# Patient Record
Sex: MALE | Race: WHITE | Employment: PART TIME | URBAN - METROPOLITAN AREA
[De-identification: names, ages, dates, MRNs, and addresses within clinical notes are randomized per-mention and may not be internally consistent; named-entity substitution may affect disease eponyms.]

---

## 2019-07-22 ENCOUNTER — HOSPITAL ENCOUNTER (EMERGENCY)
Age: 24
Discharge: HOME | End: 2019-07-22
Payer: MEDICAID

## 2019-07-22 VITALS
BODY MASS INDEX: 23.61 KG/M2 | DIASTOLIC BLOOD PRESSURE: 67 MMHG | WEIGHT: 150.44 LBS | SYSTOLIC BLOOD PRESSURE: 119 MMHG | RESPIRATION RATE: 18 BRPM | HEART RATE: 55 BPM | HEIGHT: 67 IN | TEMPERATURE: 97.6 F | OXYGEN SATURATION: 97 %

## 2019-07-22 DIAGNOSIS — Z20.2 POSSIBLE EXPOSURE TO STD: Primary | ICD-10-CM

## 2019-07-22 LAB
BACTERIA: NEGATIVE /HPF
BILIRUBIN URINE: NEGATIVE MG/DL
BLOOD, URINE: ABNORMAL
CLARITY: CLEAR
COLOR: ABNORMAL
GLUCOSE, URINE: NEGATIVE MG/DL
KETONES, URINE: NEGATIVE MG/DL
LEUKOCYTE ESTERASE, URINE: NEGATIVE
NITRITE URINE, QUANTITATIVE: NEGATIVE
PH, URINE: 7 (ref 5–8)
PROTEIN UA: NEGATIVE MG/DL
RBC URINE: 2 /HPF (ref 0–3)
SPECIFIC GRAVITY UA: 1 (ref 1–1.03)
SQUAMOUS EPITHELIAL: <1 /HPF
TRICHOMONAS: ABNORMAL /HPF
UROBILINOGEN, URINE: NORMAL MG/DL (ref 0.2–1)
WBC UA: 1 /HPF (ref 0–2)

## 2019-07-22 PROCEDURE — 81001 URINALYSIS AUTO W/SCOPE: CPT

## 2019-07-22 PROCEDURE — 6370000000 HC RX 637 (ALT 250 FOR IP): Performed by: PHYSICIAN ASSISTANT

## 2019-07-22 PROCEDURE — 87491 CHLMYD TRACH DNA AMP PROBE: CPT

## 2019-07-22 PROCEDURE — 87591 N.GONORRHOEAE DNA AMP PROB: CPT

## 2019-07-22 PROCEDURE — 99283 EMERGENCY DEPT VISIT LOW MDM: CPT

## 2019-07-22 RX ORDER — AZITHROMYCIN 250 MG/1
2000 TABLET, FILM COATED ORAL ONCE
Status: COMPLETED | OUTPATIENT
Start: 2019-07-22 | End: 2019-07-22

## 2019-07-22 RX ADMIN — AZITHROMYCIN 2000 MG: 250 TABLET, FILM COATED ORAL at 17:28

## 2019-07-22 NOTE — ED NOTES
Discharge instructions given to pt, pt verbalized understanding. All questions answered. Follow-up instructions given.      Annamaria Cisneros RN  07/22/19 3210

## 2019-07-25 LAB
CHLAMYDIA TRACHOMATIS AMPLIFIED DET: NEGATIVE
CHLAMYDIA TRACHOMATIS AMPLIFIED DET: NORMAL
N GONORRHOEAE AMPLIFIED DET: NEGATIVE
N GONORRHOEAE AMPLIFIED DET: NORMAL

## 2019-08-30 ENCOUNTER — HOSPITAL ENCOUNTER (EMERGENCY)
Age: 24
Discharge: HOME | End: 2019-08-30
Payer: MEDICAID

## 2019-08-30 VITALS
WEIGHT: 150 LBS | RESPIRATION RATE: 17 BRPM | TEMPERATURE: 98 F | DIASTOLIC BLOOD PRESSURE: 72 MMHG | HEIGHT: 67 IN | HEART RATE: 62 BPM | BODY MASS INDEX: 23.54 KG/M2 | OXYGEN SATURATION: 98 % | SYSTOLIC BLOOD PRESSURE: 115 MMHG

## 2019-08-30 DIAGNOSIS — R68.89 OTHER GENERAL SYMPTOMS AND SIGNS: ICD-10-CM

## 2019-08-30 DIAGNOSIS — K08.89 PAIN, DENTAL: Primary | ICD-10-CM

## 2019-08-30 PROCEDURE — 99283 EMERGENCY DEPT VISIT LOW MDM: CPT

## 2019-08-30 PROCEDURE — 4500000027

## 2019-08-30 PROCEDURE — 2500000003 HC RX 250 WO HCPCS: Performed by: PHYSICIAN ASSISTANT

## 2019-08-30 RX ORDER — TRANEXAMIC ACID 100 MG/ML
1000 INJECTION, SOLUTION INTRAVENOUS ONCE
Status: COMPLETED | OUTPATIENT
Start: 2019-08-30 | End: 2019-08-30

## 2019-08-30 RX ORDER — BUPIVACAINE HYDROCHLORIDE AND EPINEPHRINE 5; 5 MG/ML; UG/ML
10 INJECTION, SOLUTION EPIDURAL; INTRACAUDAL; PERINEURAL ONCE
Status: COMPLETED | OUTPATIENT
Start: 2019-08-30 | End: 2019-08-30

## 2019-08-30 RX ADMIN — TRANEXAMIC ACID 1000 MG: 1 INJECTION, SOLUTION INTRAVENOUS at 18:53

## 2019-08-30 RX ADMIN — BUPIVACAINE HYDROCHLORIDE AND EPINEPHRINE 10 ML: 5; 5 INJECTION, SOLUTION EPIDURAL; INTRACAUDAL; PERINEURAL at 18:54

## 2019-08-30 ASSESSMENT — PAIN DESCRIPTION - PAIN TYPE: TYPE: ACUTE PAIN

## 2019-08-30 ASSESSMENT — PAIN SCALES - GENERAL
PAINLEVEL_OUTOF10: 8
PAINLEVEL_OUTOF10: 8

## 2019-08-30 ASSESSMENT — PAIN DESCRIPTION - LOCATION: LOCATION: TEETH

## 2019-09-08 ENCOUNTER — HOSPITAL ENCOUNTER (EMERGENCY)
Age: 24
Discharge: HOME | End: 2019-09-08
Attending: EMERGENCY MEDICINE
Payer: MEDICAID

## 2019-09-08 VITALS
WEIGHT: 145 LBS | BODY MASS INDEX: 22.76 KG/M2 | DIASTOLIC BLOOD PRESSURE: 73 MMHG | HEART RATE: 78 BPM | OXYGEN SATURATION: 98 % | HEIGHT: 67 IN | TEMPERATURE: 98.1 F | RESPIRATION RATE: 18 BRPM | SYSTOLIC BLOOD PRESSURE: 125 MMHG

## 2019-09-08 DIAGNOSIS — K02.9 DENTAL CARIES: ICD-10-CM

## 2019-09-08 DIAGNOSIS — K08.89 PAIN, DENTAL: Primary | ICD-10-CM

## 2019-09-08 PROCEDURE — 6370000000 HC RX 637 (ALT 250 FOR IP): Performed by: EMERGENCY MEDICINE

## 2019-09-08 PROCEDURE — 96372 THER/PROPH/DIAG INJ SC/IM: CPT

## 2019-09-08 PROCEDURE — 99282 EMERGENCY DEPT VISIT SF MDM: CPT

## 2019-09-08 PROCEDURE — 6360000002 HC RX W HCPCS: Performed by: EMERGENCY MEDICINE

## 2019-09-08 RX ORDER — CHLORHEXIDINE GLUCONATE 0.12 MG/ML
15 RINSE ORAL 2 TIMES DAILY
Qty: 420 ML | Refills: 0 | Status: SHIPPED | OUTPATIENT
Start: 2019-09-08 | End: 2019-09-22

## 2019-09-08 RX ORDER — CLINDAMYCIN HYDROCHLORIDE 300 MG/1
300 CAPSULE ORAL 4 TIMES DAILY
Qty: 40 CAPSULE | Refills: 0 | Status: SHIPPED | OUTPATIENT
Start: 2019-09-08 | End: 2019-09-18

## 2019-09-08 RX ORDER — ORPHENADRINE CITRATE 30 MG/ML
60 INJECTION INTRAMUSCULAR; INTRAVENOUS ONCE
Status: DISCONTINUED | OUTPATIENT
Start: 2019-09-08 | End: 2019-09-09 | Stop reason: HOSPADM

## 2019-09-08 RX ORDER — CLINDAMYCIN HYDROCHLORIDE 150 MG/1
300 CAPSULE ORAL ONCE
Status: COMPLETED | OUTPATIENT
Start: 2019-09-08 | End: 2019-09-08

## 2019-09-08 RX ORDER — KETOROLAC TROMETHAMINE 30 MG/ML
30 INJECTION, SOLUTION INTRAMUSCULAR; INTRAVENOUS ONCE
Status: COMPLETED | OUTPATIENT
Start: 2019-09-08 | End: 2019-09-08

## 2019-09-08 RX ORDER — CYCLOBENZAPRINE HCL 10 MG
10 TABLET ORAL 3 TIMES DAILY PRN
Qty: 15 TABLET | Refills: 0 | Status: SHIPPED | OUTPATIENT
Start: 2019-09-08 | End: 2019-09-18

## 2019-09-08 RX ORDER — ACETAMINOPHEN 500 MG
1000 TABLET ORAL 3 TIMES DAILY
Qty: 60 TABLET | Refills: 0 | Status: SHIPPED | OUTPATIENT
Start: 2019-09-08

## 2019-09-08 RX ORDER — ACETAMINOPHEN 500 MG
1000 TABLET ORAL ONCE
Status: COMPLETED | OUTPATIENT
Start: 2019-09-08 | End: 2019-09-08

## 2019-09-08 RX ADMIN — CLINDAMYCIN HYDROCHLORIDE 300 MG: 150 CAPSULE ORAL at 23:37

## 2019-09-08 RX ADMIN — KETOROLAC TROMETHAMINE 30 MG: 30 INJECTION, SOLUTION INTRAMUSCULAR at 23:37

## 2019-09-08 RX ADMIN — ACETAMINOPHEN 1000 MG: 500 TABLET ORAL at 23:37

## 2019-09-08 ASSESSMENT — PAIN DESCRIPTION - LOCATION: LOCATION: TEETH

## 2019-09-08 ASSESSMENT — PAIN DESCRIPTION - PAIN TYPE: TYPE: ACUTE PAIN

## 2019-09-08 ASSESSMENT — PAIN SCALES - GENERAL
PAINLEVEL_OUTOF10: 6

## 2019-09-08 ASSESSMENT — PAIN DESCRIPTION - ORIENTATION: ORIENTATION: LEFT;LOWER

## 2019-09-09 ASSESSMENT — ENCOUNTER SYMPTOMS
COUGH: 0
SHORTNESS OF BREATH: 0
ABDOMINAL PAIN: 0
SORE THROAT: 0

## 2019-09-10 NOTE — ED PROVIDER NOTES
Emergency Department Encounter    Patient: Morena Jett  MRN: [id number]  : 1995  Date of Evaluation: 2019  ED Provider:  Rosi Issa    Triage Chief Complaint:   Dental Pain    HPI:  Morena Jett is a 20 y.o. male that presents to the ED for left premolar dental pain. He reports the crown was fracture months ago and recently the tooth has been aching him over the past 2-3 days. He reports taking ibuprofen with minimal relief. He is a current smoker. He has seen dentistry in the past for the right upper molar requiring extractions. ROS:  Review of Systems   Constitutional: Positive for activity change. HENT: Positive for dental problem. Negative for drooling and sore throat. Respiratory: Negative for cough and shortness of breath. Cardiovascular: Negative for chest pain. Gastrointestinal: Negative for abdominal pain. Past Medical History:   Diagnosis Date    Cystic fibrosis ([facility] [address]     Migraine      Past Surgical History:   Procedure Laterality Date    TYMPANOSTOMY TUBE PLACEMENT       History reviewed. No pertinent family history.   Social History     Socioeconomic History    Marital status:      Spouse name: Not on file    Number of children: Not on file    Years of education: Not on file    Highest education level: Not on file   Occupational History    Not on file   Social Needs    Financial resource strain: Not on file    Food insecurity:     Worry: Not on file     Inability: Not on file    Transportation needs:     Medical: Not on file     Non-medical: Not on file   Tobacco Use    Smoking status: Current Some Day Smoker     Packs/day: 1.00     Types: Cigarettes    Smokeless tobacco: Never Used   Substance and Sexual Activity    Alcohol use: No    Drug use: Yes     Types: Marijuana    Sexual activity: Yes   Lifestyle    Physical activity:     Days per week: Not on file     Minutes per session: Not on file    Stress: Not on file   Relationships    oropharyngeal exudate. Poor dentition noted with 1st premolar with only root visualized. Tender to percussion. Uvula midline. He also has dissolvable sutures in the right upper premolars from previous extractions. Eyes: Right eye exhibits no discharge. Left eye exhibits no discharge. Patient is tracking me as I am walking around the room without noted EOM palsy   Neck: No tracheal deviation present. Cardiovascular: Intact distal pulses. Exam reveals no gallop and no friction rub. Pulmonary/Chest: No respiratory distress. He has no wheezes. He has no rales. Abdominal: Soft. There is no tenderness. There is no guarding. Musculoskeletal: He exhibits no tenderness or deformity. Neurological: He is alert. Skin: Skin is warm and dry. Psychiatric: Judgment normal.            I have reviewed and interpreted all of the currently available lab results from this visit (if applicable):  No results found for this visit on 09/08/19. Radiographs (if obtained):    [] Radiologist's Report Reviewed:  No orders to display       Chart review shows recent radiographs:  No results found. MDM:  No drooling or muffled voice. Uvula midline. Appears non-toxic. Poor dentition. Afebrile. Patient was informed on antibiotics and peridex with smoking cessation discussed. Discussed f/u with dentistry and gave him a list of dentist follow-up . Discussed oral hydration is key and discussed return precautions. He deferred nerve block at this time. Clinical Impression:  1. Pain, dental    2. Dental caries      Disposition referral (if applicable): Alonso Smith [phone] [facility]  [phone]    See this facility or any of the following listed dental facility tomorrow.     Disposition medications (if applicable):  Discharge Medication List as of 9/8/2019 11:30 PM      START taking these medications    Details   clindamycin (CLEOCIN) 300 MG capsule Take 1 capsule by

## 2020-04-12 ENCOUNTER — APPOINTMENT (OUTPATIENT)
Dept: GENERAL RADIOLOGY | Age: 25
End: 2020-04-12
Payer: MEDICAID

## 2020-04-12 ENCOUNTER — HOSPITAL ENCOUNTER (EMERGENCY)
Age: 25
Discharge: HOME | End: 2020-04-12
Attending: EMERGENCY MEDICINE
Payer: MEDICAID

## 2020-04-12 VITALS
HEIGHT: 65 IN | RESPIRATION RATE: 18 BRPM | OXYGEN SATURATION: 94 % | HEART RATE: 101 BPM | BODY MASS INDEX: 27.49 KG/M2 | DIASTOLIC BLOOD PRESSURE: 78 MMHG | TEMPERATURE: 98 F | WEIGHT: 165 LBS | SYSTOLIC BLOOD PRESSURE: 125 MMHG

## 2020-04-12 PROCEDURE — 71045 X-RAY EXAM CHEST 1 VIEW: CPT

## 2020-04-12 PROCEDURE — 99284 EMERGENCY DEPT VISIT MOD MDM: CPT

## 2020-04-12 RX ORDER — LEVOFLOXACIN 500 MG/1
500 TABLET, FILM COATED ORAL DAILY
Qty: 10 TABLET | Refills: 0 | Status: SHIPPED | OUTPATIENT
Start: 2020-04-12 | End: 2020-04-22

## 2020-04-12 RX ORDER — PREDNISONE 10 MG/1
TABLET ORAL
Qty: 42 TABLET | Refills: 0 | Status: SHIPPED | OUTPATIENT
Start: 2020-04-12 | End: 2020-04-24

## 2020-04-12 RX ORDER — GUAIFENESIN 600 MG/1
600 TABLET, EXTENDED RELEASE ORAL 2 TIMES DAILY
Qty: 20 TABLET | Refills: 0 | Status: SHIPPED | OUTPATIENT
Start: 2020-04-12 | End: 2020-04-22

## 2020-04-12 RX ORDER — ALBUTEROL SULFATE 90 UG/1
2 AEROSOL, METERED RESPIRATORY (INHALATION) EVERY 4 HOURS PRN
Qty: 1 INHALER | Refills: 0 | Status: SHIPPED | OUTPATIENT
Start: 2020-04-12 | End: 2021-07-08

## 2020-04-12 RX ORDER — CIPROFLOXACIN AND DEXAMETHASONE 3; 1 MG/ML; MG/ML
4 SUSPENSION/ DROPS AURICULAR (OTIC) 2 TIMES DAILY
Qty: 1 BOTTLE | Refills: 0 | Status: SHIPPED | OUTPATIENT
Start: 2020-04-12 | End: 2020-04-19

## 2020-04-12 ASSESSMENT — PAIN SCALES - GENERAL: PAINLEVEL_OUTOF10: 3

## 2020-04-13 ENCOUNTER — TELEPHONE (OUTPATIENT)
Dept: OTHER | Facility: CLINIC | Age: 25
End: 2020-04-13

## 2020-04-13 NOTE — ED PROVIDER NOTES
radial pulses 2+ bilaterally. Pulmonary/Chest: Effort normal. No respiratory distress. Speaks in full sentences. Wheezing noted in all lung fields with occasional rhonchi. No rales. Abdominal: Soft. Nontender to palpation. No distension. No guarding, rebound tenderness, or evidence of ascites. : No CVA tenderness. Musculoskeletal: Moves all extremities. No gross deformity. Neurological: Alert and oriented to person, place, and time. Normal muscle tone. Skin: Warm and dry. No rash. Psychiatric: Normal mood and affect. Behavior is normal.      Radiographs (if obtained):  [] The following radiograph was interpreted by myself in the absence of a radiologist:   [x] Radiologist's Report Reviewed:  XR CHEST PORTABLE   Final Result   Negative chest.                Labs  No results found for this visit on 04/12/20. MDM  Patient presents for cough, SOB. Has h/o CF, tobacco abuse, marijuana abuse. Exam reveals wheezing and rhonchi but no respiratory distress. CXR currently pending, patient refused COVID19 testing.     2200 EDT I have signed out [facility] Emergency Department care to Dr. Abdon Buchanan. We discussed the history, physical exam, completed/pending test results (if obtained) and current treatment plan. Please refer to his/her chart for the patients further details, remaining Emergency Department course, final disposition and diagnosis. I have independently evaluated this patient. Final Impression  1. Acute suppurative otitis media of both ears with spontaneous rupture of tympanic membranes, recurrence not specified    2. Tobacco abuse    3. Marijuana abuse        Blood pressure 125/78, pulse 101, temperature 98 °F (36.7 °C), temperature source Oral, resp. rate 18, height 5' 5\" (1.651 m), weight 165 lb (74.8 kg), SpO2 94 %. This chart was generated using the [employer] dictation system.  I created this record but it may contain dictation errors given the limitations of

## 2020-04-13 NOTE — TELEPHONE ENCOUNTER
Patient contacted regarding COVID-19 risk and screening.  Unable to reach patient, left VM and requested return call

## 2020-04-13 NOTE — ED NOTES
This RN attempted to swab pt and refused. Stated he has not left his house in over 1 month and has only been around his wife and she has not left the house. Pt states he has CF and that's why he is coughing worse and just needs steroids and breathing treatment.       Alex Hernandez, RN  04/12/20 5620

## 2021-03-04 ENCOUNTER — HOSPITAL ENCOUNTER (EMERGENCY)
Age: 26
Discharge: LEFT BEFORE DISCHARGE | End: 2021-03-04
Payer: MEDICAID

## 2021-03-04 VITALS
DIASTOLIC BLOOD PRESSURE: 82 MMHG | SYSTOLIC BLOOD PRESSURE: 120 MMHG | HEART RATE: 82 BPM | TEMPERATURE: 98 F | RESPIRATION RATE: 20 BRPM | OXYGEN SATURATION: 92 %

## 2021-03-04 RX ORDER — MORPHINE SULFATE 4 MG/ML
4 INJECTION, SOLUTION INTRAMUSCULAR; INTRAVENOUS ONCE
Status: DISCONTINUED | OUTPATIENT
Start: 2021-03-04 | End: 2021-03-04 | Stop reason: HOSPADM

## 2021-03-04 RX ORDER — ONDANSETRON 2 MG/ML
4 INJECTION INTRAMUSCULAR; INTRAVENOUS ONCE
Status: DISCONTINUED | OUTPATIENT
Start: 2021-03-04 | End: 2021-03-04 | Stop reason: HOSPADM

## 2021-03-04 ASSESSMENT — PAIN DESCRIPTION - LOCATION: LOCATION: GROIN

## 2021-03-04 NOTE — Clinical Note
Patient left without being seen after triage. Patient became loud and verbally abusive to staff, security escorted patient out of ED he was aggressively trying to induce an altercation with security. [employer] called.

## 2021-03-04 NOTE — ED PROVIDER NOTES
Tele-Triage Note    I evaluated this patient via a TeleHealth platform as a physician-in-triage. I performed a medical screening evaluation on the patient remotely via the [employer]. I performed a medical screening examination and evaluated this patient briefly with the purpose of initiating their ED workup in an expeditious manner. Please see notes from other ED providers regarding comprehensive evaluation including full history, physical exam, interpretation of results, and medical decision making/disposition. In brief, Morena Pike is a 30 y.o. male who presents to the ED complaining of with left lower abdominal and left testicular pain with onset about 5 days ago which had been intermittent but became more constant this afternoon evening. Is severe intensity. Worsened with movement and palpation. Denies any swelling of the scrotum or testicle however. Denies any urinary symptoms or penile discharge. Denies any other associated symptoms are complaints. States he did pass a kidney stone about a week ago. Focused physical examination notable for moderate acute distress and appears very uncomfortable, otherwise well-appearing, well-nourished, normal speech and mentation without obvious facial droop, no obvious rash. No obvious cranial nerve deficits on my brief remote exam using telehealth technology. Vitals reviewed per nursing documentation. Triage orders placed, including CBC, BMP, urinalysis, GC and Chlamydia tests, CT of the abdomen pelvis and ultrasound of the testicles and scrotum. I did not personally review any of the results of these tests, which will be reviewed and interpreted later by ED providers at the time of the patient's more comprehensive evaluation.       Electronically signed by: Marga Sanders M.D., 3/4/2021 6:43 PM        Abril Dumont MD  03/04/21 0356

## 2021-03-05 ENCOUNTER — APPOINTMENT (OUTPATIENT)
Dept: ULTRASOUND IMAGING | Age: 26
End: 2021-03-05
Payer: MEDICAID

## 2021-03-05 ENCOUNTER — HOSPITAL ENCOUNTER (EMERGENCY)
Age: 26
Discharge: HOME | End: 2021-03-05
Attending: EMERGENCY MEDICINE
Payer: MEDICAID

## 2021-03-05 VITALS
RESPIRATION RATE: 16 BRPM | DIASTOLIC BLOOD PRESSURE: 74 MMHG | TEMPERATURE: 98.8 F | BODY MASS INDEX: 26.52 KG/M2 | HEART RATE: 63 BPM | HEIGHT: 66 IN | WEIGHT: 165 LBS | OXYGEN SATURATION: 96 % | SYSTOLIC BLOOD PRESSURE: 136 MMHG

## 2021-03-05 DIAGNOSIS — I86.1 VARICOCELE: Primary | ICD-10-CM

## 2021-03-05 LAB
BACTERIA: ABNORMAL /HPF
BILIRUBIN URINE: NEGATIVE MG/DL
BLOOD, URINE: ABNORMAL
CALCIUM OXALATE CRYSTALS: ABNORMAL /HPF
CLARITY: ABNORMAL
COLOR: ABNORMAL
GLUCOSE, URINE: NEGATIVE MG/DL
KETONES, URINE: ABNORMAL MG/DL
LEUKOCYTE ESTERASE, URINE: ABNORMAL
MUCUS: ABNORMAL HPF
NITRITE URINE, QUANTITATIVE: NEGATIVE
PH, URINE: 5 (ref 5–8)
PROTEIN UA: 100 MG/DL
RBC URINE: 1417 /HPF (ref 0–3)
SPECIFIC GRAVITY UA: 1.04 (ref 1–1.03)
TRICHOMONAS: ABNORMAL /HPF
UROBILINOGEN, URINE: NEGATIVE MG/DL (ref 0.2–1)
WBC UA: 16 /HPF (ref 0–2)

## 2021-03-05 PROCEDURE — 93975 VASCULAR STUDY: CPT

## 2021-03-05 PROCEDURE — 87491 CHLMYD TRACH DNA AMP PROBE: CPT

## 2021-03-05 PROCEDURE — 99284 EMERGENCY DEPT VISIT MOD MDM: CPT

## 2021-03-05 PROCEDURE — 87591 N.GONORRHOEAE DNA AMP PROB: CPT

## 2021-03-05 PROCEDURE — 6370000000 HC RX 637 (ALT 250 FOR IP): Performed by: EMERGENCY MEDICINE

## 2021-03-05 PROCEDURE — 81001 URINALYSIS AUTO W/SCOPE: CPT

## 2021-03-05 PROCEDURE — 76870 US EXAM SCROTUM: CPT

## 2021-03-05 RX ORDER — HYDROCODONE BITARTRATE AND ACETAMINOPHEN 5; 325 MG/1; MG/1
1 TABLET ORAL ONCE
Status: COMPLETED | OUTPATIENT
Start: 2021-03-05 | End: 2021-03-05

## 2021-03-05 RX ADMIN — HYDROCODONE BITARTRATE AND ACETAMINOPHEN 1 TABLET: 5; 325 TABLET ORAL at 04:59

## 2021-03-05 ASSESSMENT — PAIN DESCRIPTION - PAIN TYPE: TYPE: ACUTE PAIN

## 2021-03-05 ASSESSMENT — PAIN DESCRIPTION - ORIENTATION: ORIENTATION: LEFT

## 2021-03-05 NOTE — ED PROVIDER NOTES
Tenderness to palpation over left epididymis. No scrotal swelling or erythema. Normal testicular lie. EXTREMITIES: No acute deformities. SKIN: Warm and dry. NEUROLOGICAL: No gross facial drooping. Moves all 4 extremities spontaneously. PSYCHIATRIC: Normal mood. I have reviewed and interpreted all of the currently available lab results from this visit (if applicable):  No results found for this visit on 03/05/21. Radiographs (if obtained):  [] The following radiograph was interpreted by myself in the absence of a radiologist:  [] Radiologist's Report Reviewed:    EKG (if obtained): (All EKG's are interpreted by myself in the absence of a cardiologist)    MDM:  Plan of care is discussed thoroughly with the patient and family if present. If performed, all imaging and lab work also discussed with patient. All relevant prior results and chart reviewed if available. Patient presents as above. Vital signs are normal.  Presents with isolated left testicular pain and tenderness over the epididymis. No obvious hernia. Patient has benign abdominal exam.  Suspect primary testicular etiology at this time. Ultrasound ordered for further evaluation. Patient was given Norco for pain. Urinalysis ordered. Patient doing well on reevaluation. Ultrasound shows no evidence of torsion. Varicocele is noted which is likely given the patient some discomfort. He is given urology follow-up, discharged in good condition. Clinical Impression:  1.  Varicocele      (Please note that portions of this note may have been completed with a voice recognition program. Efforts were made to edit the dictations but occasionally words are mis-transcribed.)    MD Nitza Juarez MD  03/05/21 2459

## 2021-03-06 LAB
CHLAMYDIA TRACHOMATIS AMPLIFIED DET: NEGATIVE
N GONORRHOEAE AMPLIFIED DET: NEGATIVE

## 2021-06-01 ENCOUNTER — HOSPITAL ENCOUNTER (EMERGENCY)
Age: 26
Discharge: LEFT BEFORE DISCHARGE | End: 2021-06-02
Payer: MEDICAID

## 2021-06-01 PROCEDURE — 99283 EMERGENCY DEPT VISIT LOW MDM: CPT

## 2021-06-01 ASSESSMENT — PAIN SCALES - GENERAL: PAINLEVEL_OUTOF10: 8

## 2021-06-02 ENCOUNTER — APPOINTMENT (OUTPATIENT)
Dept: ULTRASOUND IMAGING | Age: 26
End: 2021-06-02
Payer: MEDICAID

## 2021-06-02 VITALS — HEART RATE: 72 BPM | RESPIRATION RATE: 19 BRPM | TEMPERATURE: 98 F | OXYGEN SATURATION: 97 %

## 2021-06-02 LAB
BACTERIA: ABNORMAL /HPF
BILIRUBIN URINE: NEGATIVE MG/DL
BLOOD, URINE: ABNORMAL
CLARITY: ABNORMAL
COLOR: YELLOW
GLUCOSE, URINE: NEGATIVE MG/DL
KETONES, URINE: ABNORMAL MG/DL
LEUKOCYTE ESTERASE, URINE: ABNORMAL
MUCUS: ABNORMAL HPF
NITRITE URINE, QUANTITATIVE: NEGATIVE
PH, URINE: 5 (ref 5–8)
PROTEIN UA: 100 MG/DL
RBC URINE: 568 /HPF (ref 0–3)
SPECIFIC GRAVITY UA: 1.03 (ref 1–1.03)
SQUAMOUS EPITHELIAL: 1 /HPF
TRICHOMONAS: ABNORMAL /HPF
URIC ACID CRYSTALS: ABNORMAL /HPF
UROBILINOGEN, URINE: 2 MG/DL (ref 0.2–1)
WBC UA: 24 /HPF (ref 0–2)

## 2021-06-02 PROCEDURE — 87086 URINE CULTURE/COLONY COUNT: CPT

## 2021-06-02 PROCEDURE — 76870 US EXAM SCROTUM: CPT

## 2021-06-02 PROCEDURE — 93975 VASCULAR STUDY: CPT

## 2021-06-02 PROCEDURE — 81001 URINALYSIS AUTO W/SCOPE: CPT

## 2021-06-02 RX ORDER — ACETAMINOPHEN 325 MG/1
650 TABLET ORAL ONCE
Status: DISCONTINUED | OUTPATIENT
Start: 2021-06-02 | End: 2021-06-02 | Stop reason: HOSPADM

## 2021-06-02 NOTE — ED PROVIDER NOTES
Tele-Triage Note    I evaluated this patient via a TeleHealth platform as a Physician in triage. I performed a medical screening evaluation on the patient remotely via the [employer]. I performed a medical screening examination and evaluated this patient briefly with the purpose of initiating their ED workup in an expeditious manner. Please see notes from other ED providers regarding comprehensive evaluation including full history, physical exam, interpretation of results, and medical decision making/disposition. In brief, Tiago Hernandez is a 30 y.o. male who presents to the ED complaining of right testicular pain. Patient reports pain for the past 2 months but reports worsened today. He denies any trauma. He does report difficulty peeing but reports that he is able to urinate however it is usually smaller amounts than he would expect. Focused physical examination notable for no acute distress, well-appearing, well-nourished, normal speech and mentation without obvious facial droop, no obvious rash. No obvious cranial nerve deficits on my brief remote exam using telehealth technology. Vitals reviewed per nursing documentation. Triage orders placed, including testicular ultrasound, urinalysis, GC and chlamydia, urine treat, p.o. Tylenol. I did not personally review any of the results of these tests, which will be reviewed and interpreted later by ED providers at the time of the patient's more comprehensive evaluation.        Albania Graham MD  06/02/21 0688

## 2021-06-02 NOTE — ED NOTES
Patient called to room unable to be located. Restroom, lobby and parking lot checked.       Galilea Oneal RN  06/02/21 9477

## 2021-06-03 LAB
CULTURE: NORMAL
Lab: NORMAL
SPECIMEN: NORMAL

## 2021-07-08 ENCOUNTER — APPOINTMENT (OUTPATIENT)
Dept: CT IMAGING | Age: 26
End: 2021-07-08
Payer: MEDICAID

## 2021-07-08 ENCOUNTER — HOSPITAL ENCOUNTER (EMERGENCY)
Age: 26
Discharge: HOME | End: 2021-07-08
Attending: EMERGENCY MEDICINE
Payer: MEDICAID

## 2021-07-08 ENCOUNTER — APPOINTMENT (OUTPATIENT)
Dept: GENERAL RADIOLOGY | Age: 26
End: 2021-07-08
Payer: MEDICAID

## 2021-07-08 VITALS
HEIGHT: 67 IN | OXYGEN SATURATION: 95 % | TEMPERATURE: 98.1 F | RESPIRATION RATE: 16 BRPM | DIASTOLIC BLOOD PRESSURE: 74 MMHG | BODY MASS INDEX: 25.9 KG/M2 | SYSTOLIC BLOOD PRESSURE: 141 MMHG | HEART RATE: 80 BPM | WEIGHT: 165 LBS

## 2021-07-08 DIAGNOSIS — F14.10 COCAINE ABUSE (HCC): ICD-10-CM

## 2021-07-08 DIAGNOSIS — N17.9 ACUTE RENAL FAILURE, UNSPECIFIED ACUTE RENAL FAILURE TYPE (HCC): Primary | ICD-10-CM

## 2021-07-08 DIAGNOSIS — R11.2 NAUSEA AND VOMITING, INTRACTABILITY OF VOMITING NOT SPECIFIED, UNSPECIFIED VOMITING TYPE: ICD-10-CM

## 2021-07-08 LAB
ALBUMIN SERPL-MCNC: 5.3 GM/DL (ref 3.4–5)
ALP BLD-CCNC: 135 IU/L (ref 40–128)
ALT SERPL-CCNC: 26 U/L (ref 10–40)
AMPHETAMINES: NEGATIVE
ANION GAP SERPL CALCULATED.3IONS-SCNC: 27 MMOL/L (ref 4–16)
AST SERPL-CCNC: 19 IU/L (ref 15–37)
BACTERIA: NEGATIVE /HPF
BARBITURATE SCREEN URINE: NEGATIVE
BASOPHILS ABSOLUTE: 0.1 K/CU MM
BASOPHILS RELATIVE PERCENT: 0.3 % (ref 0–1)
BENZODIAZEPINE SCREEN, URINE: NEGATIVE
BILIRUB SERPL-MCNC: 0.9 MG/DL (ref 0–1)
BILIRUBIN URINE: NEGATIVE MG/DL
BLOOD, URINE: NEGATIVE
BUN BLDV-MCNC: 44 MG/DL (ref 6–23)
CALCIUM SERPL-MCNC: 10.9 MG/DL (ref 8.3–10.6)
CANNABINOID SCREEN URINE: ABNORMAL
CHLORIDE BLD-SCNC: 81 MMOL/L (ref 99–110)
CHLORIDE URINE RANDOM: 10 MMOL/L (ref 43–210)
CLARITY: CLEAR
CO2: 21 MMOL/L (ref 21–32)
COCAINE METABOLITE: ABNORMAL
COLOR: YELLOW
CREAT SERPL-MCNC: 2.8 MG/DL (ref 0.9–1.3)
CREATININE URINE: 270.2 MG/DL (ref 39–259)
DIFFERENTIAL TYPE: ABNORMAL
EKG ATRIAL RATE: 94 BPM
EKG DIAGNOSIS: NORMAL
EKG P AXIS: 66 DEGREES
EKG P-R INTERVAL: 140 MS
EKG Q-T INTERVAL: 364 MS
EKG QRS DURATION: 98 MS
EKG QTC CALCULATION (BAZETT): 455 MS
EKG R AXIS: 231 DEGREES
EKG T AXIS: 37 DEGREES
EKG VENTRICULAR RATE: 94 BPM
EOSINOPHILS ABSOLUTE: 0 K/CU MM
EOSINOPHILS RELATIVE PERCENT: 0 % (ref 0–3)
ESTIMATED AVERAGE GLUCOSE: 146 MG/DL
GFR AFRICAN AMERICAN: 33 ML/MIN/1.73M2
GFR NON-AFRICAN AMERICAN: 27 ML/MIN/1.73M2
GLUCOSE BLD-MCNC: 260 MG/DL (ref 70–99)
GLUCOSE, URINE: NEGATIVE MG/DL
HBA1C MFR BLD: 6.7 % (ref 4.2–6.3)
HCT VFR BLD CALC: 54 % (ref 42–52)
HEMOGLOBIN: 19.9 GM/DL (ref 13.5–18)
HYALINE CASTS: >20 /LPF
IMMATURE NEUTROPHIL %: 0.6 % (ref 0–0.43)
KETONES, URINE: NEGATIVE MG/DL
LEUKOCYTE ESTERASE, URINE: NEGATIVE
LIPASE: 138 IU/L (ref 13–60)
LYMPHOCYTES ABSOLUTE: 1.7 K/CU MM
LYMPHOCYTES RELATIVE PERCENT: 7.1 % (ref 24–44)
MAGNESIUM: 2.9 MG/DL (ref 1.8–2.4)
MCH RBC QN AUTO: 30.3 PG (ref 27–31)
MCHC RBC AUTO-ENTMCNC: 36.9 % (ref 32–36)
MCV RBC AUTO: 82.3 FL (ref 78–100)
MONOCYTES ABSOLUTE: 1.1 K/CU MM
MONOCYTES RELATIVE PERCENT: 4.6 % (ref 0–4)
MUCUS: ABNORMAL HPF
NITRITE URINE, QUANTITATIVE: NEGATIVE
NUCLEATED RBC %: 0 %
OPIATES, URINE: NEGATIVE
OXYCODONE: NEGATIVE
PDW BLD-RTO: 12 % (ref 11.7–14.9)
PH, URINE: 5 (ref 5–8)
PHENCYCLIDINE, URINE: NEGATIVE
PLATELET # BLD: 337 K/CU MM (ref 140–440)
PMV BLD AUTO: 9.9 FL (ref 7.5–11.1)
POTASSIUM SERPL-SCNC: 3.5 MMOL/L (ref 3.5–5.1)
POTASSIUM, UR: 66.8 MMOL/L (ref 22–119)
PRO-BNP: 66.68 PG/ML
PROT/CREAT RATIO, UR: 0.1
PROTEIN UA: NEGATIVE MG/DL
RBC # BLD: 6.56 M/CU MM (ref 4.6–6.2)
RBC URINE: 1 /HPF (ref 0–3)
SARS-COV-2, NAAT: NOT DETECTED
SEGMENTED NEUTROPHILS ABSOLUTE COUNT: 20.6 K/CU MM
SEGMENTED NEUTROPHILS RELATIVE PERCENT: 87.4 % (ref 36–66)
SODIUM BLD-SCNC: 129 MMOL/L (ref 135–145)
SODIUM URINE: 12 MMOL/L (ref 35–167)
SOURCE: NORMAL
SPECIFIC GRAVITY UA: 1.02 (ref 1–1.03)
TOTAL CK: 345 IU/L (ref 38–174)
TOTAL IMMATURE NEUTOROPHIL: 0.15 K/CU MM
TOTAL NUCLEATED RBC: 0 K/CU MM
TOTAL PROTEIN: 10.1 GM/DL (ref 6.4–8.2)
TRICHOMONAS: ABNORMAL /HPF
TROPONIN T: <0.01 NG/ML
URINE TOTAL PROTEIN: 22.3 MG/DL
UROBILINOGEN, URINE: NEGATIVE MG/DL (ref 0.2–1)
WBC # BLD: 23.6 K/CU MM (ref 4–10.5)
WBC UA: 1 /HPF (ref 0–2)

## 2021-07-08 PROCEDURE — 82436 ASSAY OF URINE CHLORIDE: CPT

## 2021-07-08 PROCEDURE — 82570 ASSAY OF URINE CREATININE: CPT

## 2021-07-08 PROCEDURE — 96376 TX/PRO/DX INJ SAME DRUG ADON: CPT

## 2021-07-08 PROCEDURE — 81001 URINALYSIS AUTO W/SCOPE: CPT

## 2021-07-08 PROCEDURE — 71045 X-RAY EXAM CHEST 1 VIEW: CPT

## 2021-07-08 PROCEDURE — 80048 BASIC METABOLIC PNL TOTAL CA: CPT

## 2021-07-08 PROCEDURE — 2580000003 HC RX 258: Performed by: NURSE PRACTITIONER

## 2021-07-08 PROCEDURE — 84702 CHORIONIC GONADOTROPIN TEST: CPT

## 2021-07-08 PROCEDURE — 83690 ASSAY OF LIPASE: CPT

## 2021-07-08 PROCEDURE — 96375 TX/PRO/DX INJ NEW DRUG ADDON: CPT

## 2021-07-08 PROCEDURE — 83880 ASSAY OF NATRIURETIC PEPTIDE: CPT

## 2021-07-08 PROCEDURE — 2580000003 HC RX 258: Performed by: PHYSICIAN ASSISTANT

## 2021-07-08 PROCEDURE — 83036 HEMOGLOBIN GLYCOSYLATED A1C: CPT

## 2021-07-08 PROCEDURE — 74176 CT ABD & PELVIS W/O CONTRAST: CPT

## 2021-07-08 PROCEDURE — 85025 COMPLETE CBC W/AUTO DIFF WBC: CPT

## 2021-07-08 PROCEDURE — 1200000000 HC SEMI PRIVATE

## 2021-07-08 PROCEDURE — 6370000000 HC RX 637 (ALT 250 FOR IP): Performed by: EMERGENCY MEDICINE

## 2021-07-08 PROCEDURE — 96374 THER/PROPH/DIAG INJ IV PUSH: CPT

## 2021-07-08 PROCEDURE — 84156 ASSAY OF PROTEIN URINE: CPT

## 2021-07-08 PROCEDURE — 96361 HYDRATE IV INFUSION ADD-ON: CPT

## 2021-07-08 PROCEDURE — 93010 ELECTROCARDIOGRAM REPORT: CPT | Performed by: INTERNAL MEDICINE

## 2021-07-08 PROCEDURE — 84484 ASSAY OF TROPONIN QUANT: CPT

## 2021-07-08 PROCEDURE — C9113 INJ PANTOPRAZOLE SODIUM, VIA: HCPCS | Performed by: PHYSICIAN ASSISTANT

## 2021-07-08 PROCEDURE — 83935 ASSAY OF URINE OSMOLALITY: CPT

## 2021-07-08 PROCEDURE — 83735 ASSAY OF MAGNESIUM: CPT

## 2021-07-08 PROCEDURE — 84300 ASSAY OF URINE SODIUM: CPT

## 2021-07-08 PROCEDURE — 80307 DRUG TEST PRSMV CHEM ANLYZR: CPT

## 2021-07-08 PROCEDURE — 6360000002 HC RX W HCPCS: Performed by: PHYSICIAN ASSISTANT

## 2021-07-08 PROCEDURE — 2580000003 HC RX 258: Performed by: EMERGENCY MEDICINE

## 2021-07-08 PROCEDURE — 93005 ELECTROCARDIOGRAM TRACING: CPT | Performed by: EMERGENCY MEDICINE

## 2021-07-08 PROCEDURE — 96365 THER/PROPH/DIAG IV INF INIT: CPT

## 2021-07-08 PROCEDURE — 80053 COMPREHEN METABOLIC PANEL: CPT

## 2021-07-08 PROCEDURE — 6360000002 HC RX W HCPCS: Performed by: NURSE PRACTITIONER

## 2021-07-08 PROCEDURE — 84133 ASSAY OF URINE POTASSIUM: CPT

## 2021-07-08 PROCEDURE — 82550 ASSAY OF CK (CPK): CPT

## 2021-07-08 PROCEDURE — 71250 CT THORAX DX C-: CPT

## 2021-07-08 PROCEDURE — 99285 EMERGENCY DEPT VISIT HI MDM: CPT

## 2021-07-08 PROCEDURE — 87635 SARS-COV-2 COVID-19 AMP PRB: CPT

## 2021-07-08 PROCEDURE — 6360000002 HC RX W HCPCS: Performed by: EMERGENCY MEDICINE

## 2021-07-08 PROCEDURE — 87040 BLOOD CULTURE FOR BACTERIA: CPT

## 2021-07-08 RX ORDER — PANTOPRAZOLE SODIUM 40 MG/10ML
40 INJECTION, POWDER, LYOPHILIZED, FOR SOLUTION INTRAVENOUS DAILY
Status: DISCONTINUED | OUTPATIENT
Start: 2021-07-09 | End: 2021-07-08 | Stop reason: HOSPADM

## 2021-07-08 RX ORDER — ONDANSETRON 2 MG/ML
4 INJECTION INTRAMUSCULAR; INTRAVENOUS EVERY 6 HOURS PRN
Status: DISCONTINUED | OUTPATIENT
Start: 2021-07-08 | End: 2021-07-08 | Stop reason: HOSPADM

## 2021-07-08 RX ORDER — ACETAMINOPHEN 650 MG/1
650 SUPPOSITORY RECTAL EVERY 6 HOURS PRN
Status: DISCONTINUED | OUTPATIENT
Start: 2021-07-08 | End: 2021-07-08 | Stop reason: HOSPADM

## 2021-07-08 RX ORDER — 0.9 % SODIUM CHLORIDE 0.9 %
1000 INTRAVENOUS SOLUTION INTRAVENOUS ONCE
Status: COMPLETED | OUTPATIENT
Start: 2021-07-08 | End: 2021-07-08

## 2021-07-08 RX ORDER — SODIUM CHLORIDE 9 MG/ML
INJECTION, SOLUTION INTRAVENOUS CONTINUOUS
Status: DISCONTINUED | OUTPATIENT
Start: 2021-07-08 | End: 2021-07-08 | Stop reason: HOSPADM

## 2021-07-08 RX ORDER — ONDANSETRON 4 MG/1
4 TABLET, ORALLY DISINTEGRATING ORAL EVERY 8 HOURS PRN
Status: DISCONTINUED | OUTPATIENT
Start: 2021-07-08 | End: 2021-07-08 | Stop reason: HOSPADM

## 2021-07-08 RX ORDER — LORAZEPAM 2 MG/ML
1 INJECTION INTRAMUSCULAR EVERY 6 HOURS PRN
Status: DISCONTINUED | OUTPATIENT
Start: 2021-07-08 | End: 2021-07-08 | Stop reason: HOSPADM

## 2021-07-08 RX ORDER — SODIUM CHLORIDE 0.9 % (FLUSH) 0.9 %
5-40 SYRINGE (ML) INJECTION EVERY 12 HOURS SCHEDULED
Status: DISCONTINUED | OUTPATIENT
Start: 2021-07-08 | End: 2021-07-08 | Stop reason: HOSPADM

## 2021-07-08 RX ORDER — ONDANSETRON 2 MG/ML
4 INJECTION INTRAMUSCULAR; INTRAVENOUS EVERY 30 MIN PRN
Status: DISCONTINUED | OUTPATIENT
Start: 2021-07-08 | End: 2021-07-08 | Stop reason: HOSPADM

## 2021-07-08 RX ORDER — IPRATROPIUM BROMIDE AND ALBUTEROL SULFATE 2.5; .5 MG/3ML; MG/3ML
1 SOLUTION RESPIRATORY (INHALATION)
Status: DISCONTINUED | OUTPATIENT
Start: 2021-07-08 | End: 2021-07-08 | Stop reason: HOSPADM

## 2021-07-08 RX ORDER — SODIUM CHLORIDE 0.9 % (FLUSH) 0.9 %
5-40 SYRINGE (ML) INJECTION PRN
Status: DISCONTINUED | OUTPATIENT
Start: 2021-07-08 | End: 2021-07-08 | Stop reason: HOSPADM

## 2021-07-08 RX ORDER — NICOTINE 21 MG/24HR
1 PATCH, TRANSDERMAL 24 HOURS TRANSDERMAL DAILY
Status: DISCONTINUED | OUTPATIENT
Start: 2021-07-08 | End: 2021-07-08 | Stop reason: HOSPADM

## 2021-07-08 RX ORDER — MAGNESIUM HYDROXIDE/ALUMINUM HYDROXICE/SIMETHICONE 120; 1200; 1200 MG/30ML; MG/30ML; MG/30ML
30 SUSPENSION ORAL ONCE
Status: COMPLETED | OUTPATIENT
Start: 2021-07-08 | End: 2021-07-08

## 2021-07-08 RX ORDER — ACETAMINOPHEN 325 MG/1
650 TABLET ORAL EVERY 6 HOURS PRN
Status: DISCONTINUED | OUTPATIENT
Start: 2021-07-08 | End: 2021-07-08 | Stop reason: HOSPADM

## 2021-07-08 RX ORDER — SODIUM CHLORIDE 9 MG/ML
25 INJECTION, SOLUTION INTRAVENOUS PRN
Status: DISCONTINUED | OUTPATIENT
Start: 2021-07-08 | End: 2021-07-08 | Stop reason: HOSPADM

## 2021-07-08 RX ORDER — PANTOPRAZOLE SODIUM 40 MG/10ML
40 INJECTION, POWDER, LYOPHILIZED, FOR SOLUTION INTRAVENOUS ONCE
Status: COMPLETED | OUTPATIENT
Start: 2021-07-08 | End: 2021-07-08

## 2021-07-08 RX ADMIN — SODIUM CHLORIDE 1000 ML: 9 INJECTION, SOLUTION INTRAVENOUS at 07:04

## 2021-07-08 RX ADMIN — SODIUM CHLORIDE 1000 ML: 9 INJECTION, SOLUTION INTRAVENOUS at 06:16

## 2021-07-08 RX ADMIN — PANTOPRAZOLE SODIUM 40 MG: 40 INJECTION, POWDER, FOR SOLUTION INTRAVENOUS at 06:17

## 2021-07-08 RX ADMIN — MEROPENEM 1000 MG: 1 INJECTION, POWDER, FOR SOLUTION INTRAVENOUS at 09:11

## 2021-07-08 RX ADMIN — SODIUM CHLORIDE: 9 INJECTION, SOLUTION INTRAVENOUS at 14:37

## 2021-07-08 RX ADMIN — ONDANSETRON 4 MG: 2 INJECTION INTRAMUSCULAR; INTRAVENOUS at 14:34

## 2021-07-08 RX ADMIN — ALUMINUM HYDROXIDE, MAGNESIUM HYDROXIDE, AND SIMETHICONE 30 ML: 200; 200; 20 SUSPENSION ORAL at 06:17

## 2021-07-08 RX ADMIN — ONDANSETRON 4 MG: 2 INJECTION INTRAMUSCULAR; INTRAVENOUS at 06:17

## 2021-07-08 ASSESSMENT — PAIN DESCRIPTION - LOCATION: LOCATION: CHEST;THROAT

## 2021-07-08 ASSESSMENT — PAIN SCALES - GENERAL: PAINLEVEL_OUTOF10: 8

## 2021-07-08 ASSESSMENT — PAIN DESCRIPTION - PAIN TYPE: TYPE: ACUTE PAIN

## 2021-07-08 NOTE — ED NOTES
They were taking him up to his room.  He was told he could take a smoke break before he could go up stairs so he wanted his cig first before blood     Josue Barrios  07/08/21 1600

## 2021-07-08 NOTE — PROGRESS NOTES
PHARMACY THERAPEUTIC INTERCHANGE    Estimated Creatinine Clearance: 37 mL/min (A) (based on SCr of 2.8 mg/dL (H)).   Recent Labs     07/08/21  0601   BUN 44*   CREATININE 2.8*     Indication: Appendicitis   Ordered Drug: Ertapenem 1g x 1 (Non-Formulary Carbapenem Abx)   Interchange Drug: Meropenem 1g x 1 (Formulary Carbapenem Abx)    Hx of PCN allergy (anaphylaxis)     Jeffry Ervin [facility]   7/8/2021  8:53 AM

## 2021-07-08 NOTE — Clinical Note
Patient Class: Inpatient [101]   REQUIRED: Diagnosis: WILL (acute kidney injury) ([facility] [address] [[id number]]   Estimated Length of Stay: Estimated stay of more than 2 midnights   Telemetry/Cardiac Monitoring Required?: Yes

## 2021-07-08 NOTE — H&P
asleep. Currently denies abdominal pain. Denies drug use although told ED provider recent cocaine use. Has a significant medical history for cystic fibrosis but states lost to follow-up due to insurance issues. Denies any shortness of breath and shakes his head no when asked if there is any change in his baseline or worsening productive cough. Ten point ROS: reviewed negative, unless as noted in above HPI. Objective:   No intake or output data in the 24 hours ending 07/08/21 0731     Vitals:   Vitals:    07/08/21 0539 07/08/21 0700   BP: 129/87 109/73   Pulse: 97 79   Resp: 22 17   Temp: 98.1 °F (36.7 °C)    TempSrc: Oral    SpO2: 95% 95%   Weight: 165 lb (74.8 kg)    Height: 5' 7\" (1.702 m)        Physical Exam: 07/08/21     GEN -asleep, nontoxic appearing male, sitting upright in bed , NAD. Normal body habitus. Appears given age. EYES -No scleral erythema, discharge, or conjunctivitis. HENT -MM are moist. Oral pharynx without exudates, no evidence of thrush. NECK -Supple, no apparent thyromegaly or masses. RESP -CTA, no wheezes, rales or rhonchi. Symmetric chest movement while on RA.   C/V -S1/S2 auscultated. RRR without appreciable M/R/G. No JVD or carotid bruits. Peripheral pulses equal bilaterally and palpable. Cap refill <3 sec. No peripheral edema. GI -Abdomen is soft non distended and without significant TTP. + BS. No masses or guarding. Rectal exam deferred. No HSM   -No CVA/ flank tenderness. Michaud catheter is not present. LYMPH-No palpable cervical lymphadenopathy and no hepatosplenomegaly. No petechiae or ecchymoses. MS -No gross joint deformities. SKIN -Normal coloration, warm, dry. NEURO-Cranial nerves appear grossly intact, normal speech, no lateralizing weakness. PSYC-somnolent but awakens to voice, alert, oriented x 4-flat affect.     Past Medical History:      Past Medical History:   Diagnosis Date    Cystic fibrosis ([facility] [address]     Migraine        Past Surgical  History:    has a past surgical history that includes Tympanostomy tube placement. Social History:    FAM HX: Reviewed and noncontributory     Soc HX:   Social History     Socioeconomic History    Marital status:      Spouse name: None    Number of children: None    Years of education: None    Highest education level: None   Occupational History    None   Tobacco Use    Smoking status: Current Some Day Smoker     Packs/day: 1.00     Types: Cigarettes    Smokeless tobacco: Never Used   Vaping Use    Vaping Use: Never used   Substance and Sexual Activity    Alcohol use: No    Drug use: Yes     Types: Marijuana, Cocaine    Sexual activity: Yes   Other Topics Concern    None   Social History Narrative    None     Social Determinants of Health     Financial Resource Strain:     Difficulty of Paying Living Expenses:    Food Insecurity:     Worried About Running Out of Food in the Last Year:     Ran Out of Food in the Last Year:    Transportation Needs:     Lack of Transportation (Medical):     Lack of Transportation (Non-Medical):    Physical Activity:     Days of Exercise per Week:     Minutes of Exercise per Session:    Stress:     Feeling of Stress :    Social Connections:     Frequency of Communication with Friends and Family:     Frequency of Social Gatherings with Friends and Family:     Attends [employer] Services: Active Member of Clubs or Organizations:     Attends Club or Organization Meetings:     Marital Status:    Intimate Partner Violence:     Fear of Current or Ex-Partner:     Emotionally Abused:     Physically Abused:     Sexually Abused:      TOBACCO:   reports that he has been smoking cigarettes. He has been smoking about 1.00 pack per day. He has never used smokeless tobacco.  ETOH:   reports no history of alcohol use. Drugs:  reports current drug use. Drugs: Marijuana and Cocaine. Allergies:    Allergies   Allergen Reactions    Menthol [Menthol] Anaphylaxis    Pcn [Penicillins] Anaphylaxis    Aleve [Naproxen] Hives    Neosporin [Neomycin-Polymyx-Gramicid] Hives       Home Medications:     Prior to Admission medications    Medication Sig Start Date End Date Taking? Authorizing Provider   albuterol sulfate  (90 Base) MCG/ACT inhaler Inhale 2 puffs into the lungs every 4 hours as needed for Wheezing 4/12/20 4/19/20  Za Presley,    acetaminophen (TYLENOL) 500 MG tablet Take 2 tablets by mouth 3 times daily 9/8/19   Shaheen Ocampo MD   HYDROcodone-acetaminophen (NORCO) 5-325 MG per tablet Take 1-2 tablets by mouth every 4 hours as needed for Pain. 4/11/14   JASPREET Garcia CNP         Medications:   Medications:    sodium chloride  1,000 mL Intravenous Once      Infusions:    PRN Meds: ondansetron, 4 mg, Q30 Min PRN        Data:     Laboratory this visit:  Reviewed  Recent Labs     07/08/21  0601   WBC 23.6*   HGB 19.9*   HCT 54.0*         Recent Labs     07/08/21  0601   *   K 3.5   CL 81*   CO2 21   BUN 44*   CREATININE 2.8*     Recent Labs     07/08/21  0601   AST 19   ALT 26   BILITOT 0.9   ALKPHOS 135*     No results for input(s): INR in the last 72 hours. Radiology this visit:  Reviewed. XR CHEST PORTABLE    Result Date: 7/8/2021  EXAMINATION: ONE XRAY VIEW OF THE CHEST 7/8/2021 6:06 am COMPARISON: 04/12/2020 HISTORY: Acute chest pain, GERD. FINDINGS: Cardiomediastinal silhouette and pulmonary vasculature are normal. No consolidation, pleural effusion, or pneumothorax. No acute abnormality.            EKG this visit:  Reviewed     Electronically signed by JASPREET Griggs CNP on 7/8/2021 at 7:31 AM

## 2021-07-08 NOTE — PROGRESS NOTES
Called about pt earlier given abnormal CT findings, nausea, emesis, and WBC count. Pt was added on to OR schedule for lap appy; however, I was called by ED notifying me the pt is leaving AMA. Pt is reportedly A&Ox3 and able to make decision. Not recommended for pt to leave given his medical conditions; however, he does have personal autonomy.      Victoria Ling MD

## 2021-07-08 NOTE — ED NOTES
Pt in bed with lights out, warm blanket is provided. Call light in reach. Pt notified of urine sample needed.      Alana Kayser, RN  07/08/21 6583

## 2021-07-08 NOTE — ED NOTES
Dr Carolina Emanuel at bedside     [facility], [address] [place] [address]  07/08/21 5970

## 2021-07-08 NOTE — ED PROVIDER NOTES
CHIEF COMPLAINT  Chief Complaint   Patient presents with    Emesis     reports acid reflux, bloody emesis        HPI  Madison Smith is a 30 y.o. male with history of cystic fibrosis who presents nausea, vomiting and feeling unwell that started at 4 AM and he had a small amount of coffee-ground emesis in the bathroom at time of arrival here. He states \"it is like my GERD but worse\". He states that he has been feeling unwell for the past 4 to 5 days, starting at 4 AM he started having vomiting. He states \"my lungs bother me every single day\". He denies any acute exacerbation in this. Denies any fevers. He does have diffuse abdominal pain, greatest in the left upper quadrant. He also used cocaine. He has not been able to follow with cystic fibrosis clinic secondary to insurance issues. He denies any leg swelling, he has had diffuse muscle cramps in his feet, abdomen, back which he relates to \"maybe my potassium is low\". His baseline cough is always productive. He tried taking a hot shower and using a salt water gargle with minimal signs and symptoms improvement. He denies any change in stools. He is still urinating.       REVIEW OF SYSTEMS  Review of Systems   History obtained from chart review and the patient  General ROS: negative for - fever  Ophthalmic ROS: negative for - decreased vision or double vision  ENT ROS: negative for - headaches  Hematological and Lymphatic ROS: negative for - blood clots  Endocrine ROS: negative for - unexpected weight changes  Respiratory ROS: positive for - cough  Cardiovascular ROS: Positive for burning chest pain  Gastrointestinal ROS: positive for - abdominal pain and nausea/vomiting  Genito-Urinary ROS: no dysuria, trouble voiding, or hematuria  Musculoskeletal ROS: positive for -muscle spasms  Neurological ROS: no TIA or stroke symptoms      PAST MEDICAL HISTORY  Past Medical History:   Diagnosis Date    Cystic fibrosis (HCC)     Migraine        FAMILY HISTORY  History reviewed. No pertinent family history. SOCIAL HISTORY  Social History     Socioeconomic History    Marital status:      Spouse name: None    Number of children: None    Years of education: None    Highest education level: None   Occupational History    None   Tobacco Use    Smoking status: Current Some Day Smoker     Packs/day: 1.00     Types: Cigarettes    Smokeless tobacco: Never Used   Vaping Use    Vaping Use: Never used   Substance and Sexual Activity    Alcohol use: No    Drug use: Yes     Types: Marijuana, Cocaine    Sexual activity: Yes   Other Topics Concern    None   Social History Narrative    None     Social Determinants of Health     Financial Resource Strain:     Difficulty of Paying Living Expenses:    Food Insecurity:     Worried About Running Out of Food in the Last Year:     Ran Out of Food in the Last Year:    Transportation Needs:     Lack of Transportation (Medical):  Lack of Transportation (Non-Medical):    Physical Activity:     Days of Exercise per Week:     Minutes of Exercise per Session:    Stress:     Feeling of Stress :    Social Connections:     Frequency of Communication with Friends and Family:     Frequency of Social Gatherings with Friends and Family:     Attends Taoist Services:     Active Member of Clubs or Organizations:     Attends Club or Organization Meetings:     Marital Status:    Intimate Partner Violence:     Fear of Current or Ex-Partner:     Emotionally Abused:     Physically Abused:     Sexually Abused:        SURGICAL HISTORY  Past Surgical History:   Procedure Laterality Date    TYMPANOSTOMY TUBE PLACEMENT         CURRENT MEDICATIONS  No current facility-administered medications on file prior to encounter.      Current Outpatient Medications on File Prior to Encounter   Medication Sig Dispense Refill    albuterol sulfate  (90 Base) MCG/ACT inhaler Inhale 2 puffs into the lungs every 4 hours as needed for Wheezing 1 Inhaler 0    acetaminophen (TYLENOL) 500 MG tablet Take 2 tablets by mouth 3 times daily 60 tablet 0    HYDROcodone-acetaminophen (NORCO) 5-325 MG per tablet Take 1-2 tablets by mouth every 4 hours as needed for Pain. 15 tablet 0         ALLERGIES  Allergies   Allergen Reactions    Menthol [Menthol] Anaphylaxis    Pcn [Penicillins] Anaphylaxis    Aleve [Naproxen] Hives    Neosporin [Neomycin-Polymyx-Gramicid] Hives       PHYSICAL EXAM  VITAL SIGNS: /73   Pulse 79   Temp 98.1 °F (36.7 °C) (Oral)   Resp 17   Ht 5' 7\" (1.702 m)   Wt 165 lb (74.8 kg)   SpO2 95%   BMI 25.84 kg/m²   Constitutional: Well developed, Well nourished, resting in bed, pleasant  HENT: Normocephalic, Atraumatic, Bilateral external ears normal, Oropharynx moist, No oral exudates, Nose normal.   Eyes: PERRL, EOMI, Conjunctiva normal, No discharge. Neck: Normal range of motion, Supple, No stridor. Cardiovascular: Normal heart rate, Normal rhythm, No murmurs, No rubs, No gallops. Thorax & Lungs: Slightly diminished bibasilar breath sounds, No respiratory distress, No wheezing, No chest tenderness. Abdomen: Bowel sounds normal, Soft, diffuse tenderness, no guarding, no rebound, No masses, No pulsatile masses. Skin: Warm, Dry, No erythema, No rash. Extremities: Intact distal pulses, No edema, No tenderness, No cyanosis, No clubbing. Musculoskeletal: Good gross range of motion in all major joints. No major deformities noted. Neurologic: Alert & oriented x 3, Normal gross motor function, Normal gross sensory function, No focal deficits noted.    Psychiatric: Affect nor labile mood anxious     EKG  EKG Interpretation    Interpreted by emergency department physician from July 8 at 612    Rhythm: normal sinus   Rate: normal  Axis: right  Ectopy: none  Conduction: nonspecific interventricular conduction block  ST Segments: nonspecific changes  T Waves: non specific changes  Q Waves: none    Clinical Impression: Normal sinus rhythm with a rate of 94 and a QTC of 455, incomplete right bundle branch block    Jorge Gastelum MD      RADIOLOGY/PROCEDURES/LABS  Last Imaging results   XR CHEST PORTABLE   Final Result   No acute abnormality. CT CHEST WO CONTRAST    (Results Pending)   CT ABDOMEN PELVIS WO CONTRAST Additional Contrast? None    (Results Pending)       Imaging reviewed by myself    Labs Reviewed   CBC WITH AUTO DIFFERENTIAL - Abnormal; Notable for the following components:       Result Value    WBC 23.6 (*)     RBC 6.56 (*)     Hemoglobin 19.9 (*)     Hematocrit 54.0 (*)     MCHC 36.9 (*)     Segs Relative 87.4 (*)     Lymphocytes % 7.1 (*)     Monocytes % 4.6 (*)     Immature Neutrophil % 0.6 (*)     All other components within normal limits   COMPREHENSIVE METABOLIC PANEL W/ REFLEX TO MG FOR LOW K - Abnormal; Notable for the following components:    Sodium 129 (*)     Chloride 81 (*)     BUN 44 (*)     CREATININE 2.8 (*)     Glucose 260 (*)     Calcium 10.9 (*)     Albumin 5.3 (*)     Total Protein 10.1 (*)     Alkaline Phosphatase 135 (*)     GFR Non- 27 (*)     GFR  33 (*)     Anion Gap 27 (*)     All other components within normal limits   LIPASE - Abnormal; Notable for the following components:    Lipase 138 (*)     All other components within normal limits   MAGNESIUM - Abnormal; Notable for the following components:    Magnesium 2.9 (*)     All other components within normal limits   CK - Abnormal; Notable for the following components:     Total  (*)     All other components within normal limits   TROPONIN   BRAIN NATRIURETIC PEPTIDE   URINALYSIS   URINE DRUG SCREEN         Medications   ondansetron (ZOFRAN) injection 4 mg (4 mg Intravenous Given 7/8/21 0617)   0.9 % sodium chloride bolus (1,000 mLs Intravenous New Bag 7/8/21 0704)   pantoprazole (PROTONIX) injection 40 mg (has no administration in time range)   pantoprazole (PROTONIX) injection 40 mg (40 mg Intravenous Given 7/8/21 0617)   0.9 % sodium chloride bolus (1,000 mLs Intravenous New Bag 7/8/21 0616)   aluminum & magnesium hydroxide-simethicone (MAALOX) 200-200-20 MG/5ML suspension 30 mL (30 mLs Oral Given 7/8/21 0617)       COURSE & MEDICAL DECISION MAKING  Pertinent Labs & Imaging studies reviewed. (See chart for details)    57-year-old male presents with cough, chest pain, vomiting with coffee-ground emesis, abdominal pain, feeling generalizability unwell. Considered in the differential diagnosis was cystic fibrosis induced pancreatitis, gastritis, PUD, GERD, esophagitis, less likely PE, Boerhaave's or Zhane-Colunga. He was started on rehydration, treated with Zofran, Protonix and GI cocktail and at time of recheck his discomfort has resolved, he is sleeping soundly and feeling improved. His labs do suggest significant dehydration with acute renal failure, mild hyponatremia. He does have a significant leukocytosis, this may be due demarginalization as he is not having a focal area of infection at this time. No empiric antibiotics given as no focal source of infection at this time. He also has significant elevation of hemoglobin, his baseline hemoglobin is 16, possibly related to his underlying lung condition, however 19.9 is significant elevated and suggest dehydration. 2 L fluids given in the department. He will require hospitalization, discussed with the hospitalist who is agreeable to assume care. CRITICAL CARE NOTE:  There was a high probability of clinically significant life-threatening deterioration of the patient's condition requiring my urgent intervention due to acute renal failure. IV rehydration, symptom control, recheck was performed to address this. Total critical care time is 15 minutes.     This includes vital sign monitoring, pulse oximetry monitoring, telemetry monitoring, clinical response to the IV medications, reviewing the nursing notes, consultation time, dictation/documentation time, and interpretation of the lab work. This time excludes time spent performing procedures and separately billable procedures and family discussion time. Addendum: After further discussions with radiology, the patient has an enlarged appendix. He is not having any specific tenderness overlying this area, his tenderness is in the left upper quadrant but with his leukocytosis I will order a one-time dose of antibiotic for coverage. I have lower clinical suspicion for clinical appendicitis, however the patient has diffuse complaints and does have vomiting and leukocytosis. FINAL IMPRESSION  Problem List Items Addressed This Visit     None      Visit Diagnoses     Acute renal failure, unspecified acute renal failure type ([facility]    -  Primary    Cocaine abuse (HCC)        Nausea and vomiting, intractability of vomiting not specified, unspecified vomiting type          1.    2.   3.    Patient gave me permission to discuss medical history, care, and plan with those present in the room.   Electronically signed by: Stephany Becerra MD, 7/8/2021  MD Stephany Cruz MD  07/08/21 4200       Stephany Becerra MD  07/08/21 0106

## 2021-07-08 NOTE — ED NOTES
Pt stated \" I am on cocaine tonight so I wanted to know this\"     Yosef Talavera, RN  07/08/21 6825

## 2021-07-08 NOTE — CONSULTS
Nephrology Service Consultation    Patient:  Ramiro Moyer  MRN: [id number]  Consulting physician:  Lisy Ibanez MD  Reason for Consult: Acute renal failure    History Obtained From:  patient, electronic medical record  PCP: No primary care provider on file. HISTORY OF PRESENT ILLNESS:   The patient is a 30 y.o. male who presents with weakness abdominal pain with uncontrolled nausea vomiting with no intake for the last 2 to 3 days. Patient states he does use NSAIDs for pain control. Has underlying migraines and history of appendix related issues and pancreatitis in the past.  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 Patient presents with coffee-ground emesis and CAT scan shows evidence of possible appendicitis and also renal stone. But nonobstructing. Patient is admitting using cocaine about 2 to 3 days ago. Underlying cystic fibrosis but does not follow-up with pulmonology. Also history of varicocele in the testicle but not followed up with urology for that either. In the above setting now noted to have acute renal failure and renal asked to evaluate.     Past Medical History:        Diagnosis Date    Cystic fibrosis ([facility]     Migraine        Past Surgical History:        Procedure Laterality Date    TYMPANOSTOMY TUBE PLACEMENT         Medications:   Scheduled Meds:   sodium chloride flush  5-40 mL Intravenous 2 times per day    enoxaparin  40 mg Subcutaneous Daily    [START ON 7/9/2021] pantoprazole  40 mg Intravenous Daily    ipratropium-albuterol  1 ampule Inhalation Q4H WA    nicotine  1 patch Transdermal Daily    meropenem  1,000 mg Intravenous Q12H     Continuous Infusions:   sodium chloride 75 mL/hr at 07/08/21 1437    sodium chloride       PRN Meds:.ondansetron, sodium chloride flush, sodium chloride, ondansetron **OR** ondansetron, acetaminophen **OR** acetaminophen, LORazepam    Allergies:  Menthol [menthol], Pcn [penicillins], Aleve 07/08/2021    TRICHOMONAS NONE SEEN 07/08/2021    BACTERIA NEGATIVE 07/08/2021    CLARITYU CLEAR 07/08/2021    SPECGRAV 1.021 07/08/2021    UROBILINOGEN NEGATIVE 07/08/2021    BILIRUBINUR NEGATIVE 07/08/2021    BLOODU NEGATIVE 07/08/2021    KETUA NEGATIVE 07/08/2021     ABG:  No results found for: PHART, XKJ2SLR, PO2ART, UVT4JCO, BEART, THGBART, QSL0MCB, X2QIYDYX  HgBA1c:  No results found for: LABA1C  Microalbumen/Creatinine ratio:  No components found for: RUCREAT  TSH:  No results found for: TSH  IRON:  No results found for: IRON  Iron Saturation:  No components found for: PERCENTFE  TIBC:  No results found for: TIBC  FERRITIN:  No results found for: FERRITIN  RPR:  No results found for: RPR  TERI:  No results found for: ANATITER, TERI  24 Hour Urine for Creatinine Clearance:  No components found for: CREAT4, UHRS10, UTV10  -----------------------------------------------------------------      Assessment and Recommendations     Patient Active Problem List   Diagnosis Code    [facility] N17.9     Impression plan  1. Acute renal failure from ATN/volume depletion  2. Cocaine use   #3 increased WBC count with appendicitis  4. Hyponatremia  5. History of cystic fibrosis  6. Hyperglycemia    Plan  1. Avoid NSAIDs aggressive IV fluids increase normal saline 125 cc/h we will monitor for now avoid any toxic medications. 2.  Educated on the fact that he should not use cocaine as that would make everything worse  3. Plan for pending surgery later today. 4.  Maintain IV antibiotics in the above settings #5 monitor sodium is give normal saline  6. We will probably need follow-up with pulmonology at some point but not felt to be any acute concerns  7. Not told to be a diabetic in the past sugar is high will need to monitor glucose and see if it trends down. May need to check an A1c  8. Monitor hemoglobin  9.   Very anxious individual need to monitor closely we will follow with you in the above setting and make adjustments accordingly  Electronically signed by Corby Miller MD on 7/8/2021 at 3:15 PM

## 2021-07-08 NOTE — PROGRESS NOTES
Medication History  [facility]    Patient Name: Rody Perea 1995     Medication history has been completed by: Manuel Jim CPhT    Source(s) of information: OAS     Primary Care Physician: No primary care provider on file. Pharmacy: [facility]    Allergies as of 07/08/2021 - Fully Reviewed 07/08/2021   Allergen Reaction Noted    Menthol [menthol] Anaphylaxis 03/09/2012    Pcn [penicillins] Anaphylaxis 03/09/2012    Aleve [naproxen] Hives 09/20/2013    Neosporin [neomycin-polymyx-gramicid] Hives 09/20/2013        Prior to Admission medications    Medication Sig Start Date End Date Taking? Authorizing Provider   acetaminophen (TYLENOL) 500 MG tablet Take 2 tablets by mouth 3 times daily 9/8/19   Devika Lester MD     Medications removed from list (include reason, ex. noncompliance, medication cost, therapy complete etc.):   Albuterol inhaler no recent scripts  Norco per OARRS no history    Comments:  Unable to assess patient d/t patient sleeping on multiple attempts.   OARRS report complete    To my knowledge the above medication history is accurate as of 7/8/2021 11:55 AM.   Manuel Jim CPhT   7/8/2021 11:55 AM

## 2021-07-10 LAB — OSMOLALITY URINE: 649 MOSM/KG (ref 50–800)

## 2023-03-31 ENCOUNTER — APPOINTMENT (OUTPATIENT)
Dept: CT IMAGING | Age: 28
End: 2023-03-31
Payer: MEDICAID

## 2023-03-31 ENCOUNTER — HOSPITAL ENCOUNTER (EMERGENCY)
Age: 28
Discharge: HOME | End: 2023-03-31
Payer: MEDICAID

## 2023-03-31 VITALS
HEART RATE: 81 BPM | TEMPERATURE: 98 F | SYSTOLIC BLOOD PRESSURE: 124 MMHG | BODY MASS INDEX: 29.73 KG/M2 | DIASTOLIC BLOOD PRESSURE: 84 MMHG | OXYGEN SATURATION: 98 % | HEIGHT: 66 IN | RESPIRATION RATE: 16 BRPM | WEIGHT: 185 LBS

## 2023-03-31 DIAGNOSIS — R10.84 GENERALIZED ABDOMINAL PAIN: Primary | ICD-10-CM

## 2023-03-31 DIAGNOSIS — K85.90 ACUTE PANCREATITIS, UNSPECIFIED COMPLICATION STATUS, UNSPECIFIED PANCREATITIS TYPE: ICD-10-CM

## 2023-03-31 DIAGNOSIS — Z86.39 HISTORY OF CYSTIC FIBROSIS: ICD-10-CM

## 2023-03-31 LAB
ALBUMIN SERPL-MCNC: 4.4 GM/DL (ref 3.4–5)
ALP BLD-CCNC: 102 IU/L (ref 40–129)
ALT SERPL-CCNC: 19 U/L (ref 10–40)
ANION GAP SERPL CALCULATED.3IONS-SCNC: 12 MMOL/L (ref 4–16)
AST SERPL-CCNC: 19 IU/L (ref 15–37)
BASOPHILS ABSOLUTE: 0.1 K/CU MM
BASOPHILS RELATIVE PERCENT: 0.5 % (ref 0–1)
BILIRUB SERPL-MCNC: 0.2 MG/DL (ref 0–1)
BILIRUBIN URINE: NEGATIVE MG/DL
BLOOD, URINE: NEGATIVE
BUN SERPL-MCNC: 14 MG/DL (ref 6–23)
CALCIUM SERPL-MCNC: 9.3 MG/DL (ref 8.3–10.6)
CHLORIDE BLD-SCNC: 102 MMOL/L (ref 99–110)
CLARITY: CLEAR
CO2: 24 MMOL/L (ref 21–32)
COLOR: YELLOW
COMMENT UA: NORMAL
CREAT SERPL-MCNC: 0.9 MG/DL (ref 0.9–1.3)
DIFFERENTIAL TYPE: ABNORMAL
EOSINOPHILS ABSOLUTE: 0.1 K/CU MM
EOSINOPHILS RELATIVE PERCENT: 1.4 % (ref 0–3)
GFR SERPL CREATININE-BSD FRML MDRD: >60 ML/MIN/1.73M2
GLUCOSE SERPL-MCNC: 120 MG/DL (ref 70–99)
GLUCOSE, URINE: NEGATIVE MG/DL
HCT VFR BLD CALC: 46.5 % (ref 42–52)
HEMOGLOBIN: 16.3 GM/DL (ref 13.5–18)
IMMATURE NEUTROPHIL %: 0.3 % (ref 0–0.43)
KETONES, URINE: NEGATIVE MG/DL
LEUKOCYTE ESTERASE, URINE: NEGATIVE
LIPASE: 125 IU/L (ref 13–60)
LYMPHOCYTES ABSOLUTE: 3.4 K/CU MM
LYMPHOCYTES RELATIVE PERCENT: 34 % (ref 24–44)
MCH RBC QN AUTO: 29.4 PG (ref 27–31)
MCHC RBC AUTO-ENTMCNC: 35.1 % (ref 32–36)
MCV RBC AUTO: 83.9 FL (ref 78–100)
MONOCYTES ABSOLUTE: 0.8 K/CU MM
MONOCYTES RELATIVE PERCENT: 8.1 % (ref 0–4)
NITRITE URINE, QUANTITATIVE: NEGATIVE
NUCLEATED RBC %: 0 %
PDW BLD-RTO: 11.9 % (ref 11.7–14.9)
PH, URINE: 8 (ref 5–8)
PLATELET # BLD: 246 K/CU MM (ref 140–440)
PMV BLD AUTO: 10.2 FL (ref 7.5–11.1)
POTASSIUM SERPL-SCNC: 4.2 MMOL/L (ref 3.5–5.1)
PROTEIN UA: NEGATIVE MG/DL
RBC # BLD: 5.54 M/CU MM (ref 4.6–6.2)
SEGMENTED NEUTROPHILS ABSOLUTE COUNT: 5.5 K/CU MM
SEGMENTED NEUTROPHILS RELATIVE PERCENT: 55.7 % (ref 36–66)
SODIUM BLD-SCNC: 138 MMOL/L (ref 135–145)
SPECIFIC GRAVITY UA: <1.005 (ref 1–1.03)
TOTAL IMMATURE NEUTOROPHIL: 0.03 K/CU MM
TOTAL NUCLEATED RBC: 0 K/CU MM
TOTAL PROTEIN: 6.9 GM/DL (ref 6.4–8.2)
UROBILINOGEN, URINE: 0.2 MG/DL (ref 0.2–1)
WBC # BLD: 10 K/CU MM (ref 4–10.5)

## 2023-03-31 PROCEDURE — A4216 STERILE WATER/SALINE, 10 ML: HCPCS | Performed by: PHYSICIAN ASSISTANT

## 2023-03-31 PROCEDURE — 6360000004 HC RX CONTRAST MEDICATION: Performed by: PHYSICIAN ASSISTANT

## 2023-03-31 PROCEDURE — 83690 ASSAY OF LIPASE: CPT

## 2023-03-31 PROCEDURE — 99285 EMERGENCY DEPT VISIT HI MDM: CPT

## 2023-03-31 PROCEDURE — 74177 CT ABD & PELVIS W/CONTRAST: CPT

## 2023-03-31 PROCEDURE — 2580000003 HC RX 258: Performed by: PHYSICIAN ASSISTANT

## 2023-03-31 PROCEDURE — 80053 COMPREHEN METABOLIC PANEL: CPT

## 2023-03-31 PROCEDURE — 81003 URINALYSIS AUTO W/O SCOPE: CPT

## 2023-03-31 PROCEDURE — 2500000003 HC RX 250 WO HCPCS: Performed by: PHYSICIAN ASSISTANT

## 2023-03-31 PROCEDURE — 96375 TX/PRO/DX INJ NEW DRUG ADDON: CPT

## 2023-03-31 PROCEDURE — 85025 COMPLETE CBC W/AUTO DIFF WBC: CPT

## 2023-03-31 PROCEDURE — 6360000002 HC RX W HCPCS: Performed by: PHYSICIAN ASSISTANT

## 2023-03-31 PROCEDURE — 96374 THER/PROPH/DIAG INJ IV PUSH: CPT

## 2023-03-31 RX ORDER — PANTOPRAZOLE SODIUM 20 MG/1
40 TABLET, DELAYED RELEASE ORAL DAILY
Qty: 30 TABLET | Refills: 0 | Status: SHIPPED | OUTPATIENT
Start: 2023-03-31

## 2023-03-31 RX ORDER — ONDANSETRON 2 MG/ML
8 INJECTION INTRAMUSCULAR; INTRAVENOUS ONCE
Status: COMPLETED | OUTPATIENT
Start: 2023-03-31 | End: 2023-03-31

## 2023-03-31 RX ORDER — DICYCLOMINE HYDROCHLORIDE 10 MG/1
10 CAPSULE ORAL 4 TIMES DAILY
Qty: 120 CAPSULE | Refills: 0 | Status: SHIPPED | OUTPATIENT
Start: 2023-03-31

## 2023-03-31 RX ORDER — ONDANSETRON 4 MG/1
4 TABLET, ORALLY DISINTEGRATING ORAL 3 TIMES DAILY PRN
Qty: 21 TABLET | Refills: 0 | Status: SHIPPED | OUTPATIENT
Start: 2023-03-31

## 2023-03-31 RX ORDER — SODIUM CHLORIDE 0.9 % (FLUSH) 0.9 %
5-40 SYRINGE (ML) INJECTION 2 TIMES DAILY
Status: DISCONTINUED | OUTPATIENT
Start: 2023-03-31 | End: 2023-03-31 | Stop reason: HOSPADM

## 2023-03-31 RX ORDER — SODIUM CHLORIDE, SODIUM LACTATE, POTASSIUM CHLORIDE, AND CALCIUM CHLORIDE .6; .31; .03; .02 G/100ML; G/100ML; G/100ML; G/100ML
1000 INJECTION, SOLUTION INTRAVENOUS ONCE
Status: COMPLETED | OUTPATIENT
Start: 2023-03-31 | End: 2023-03-31

## 2023-03-31 RX ADMIN — IOPAMIDOL 80 ML: 755 INJECTION, SOLUTION INTRAVENOUS at 15:04

## 2023-03-31 RX ADMIN — FAMOTIDINE 20 MG: 10 INJECTION, SOLUTION INTRAVENOUS at 14:22

## 2023-03-31 RX ADMIN — SODIUM CHLORIDE, POTASSIUM CHLORIDE, SODIUM LACTATE AND CALCIUM CHLORIDE 1000 ML: 600; 310; 30; 20 INJECTION, SOLUTION INTRAVENOUS at 14:21

## 2023-03-31 RX ADMIN — ONDANSETRON 8 MG: 2 INJECTION INTRAMUSCULAR; INTRAVENOUS at 14:22

## 2023-03-31 ASSESSMENT — ENCOUNTER SYMPTOMS
NAUSEA: 1
CHEST TIGHTNESS: 0
DIARRHEA: 0
ABDOMINAL PAIN: 1
VOMITING: 0

## 2023-03-31 NOTE — ED NOTES
CT ABDOMEN PELVIS W IV CONTRAST Additional Contrast? None [[id number]]  Status: Final result     Order Providers    Authorizing Billing   LAKHWINDER Lemus MD            Signed by    Signed Date/Time Phone Pager   Tommy Gomez 3/31/2023  3:49 [phone]      Reading Providers    Read Date Phone Pager   Tommy Gomez Fri Mar 31, 2023  3:49 [phone]        CT ABDOMEN PELVIS W IV CONTRAST Additional Contrast? None: Patient Communication     Released  Not seen     Radiation Dose Estimates    No radiation information found for this patient  Narrative   EXAMINATION:   CT OF THE ABDOMEN AND PELVIS WITH CONTRAST 3/31/2023 2:28 pm       TECHNIQUE:   CT of the abdomen and pelvis was performed with the administration of   intravenous contrast. Multiplanar reformatted images are provided for review. Automated exposure control, iterative reconstruction, and/or weight based   adjustment of the mA/kV was utilized to reduce the radiation dose to as low   as reasonably achievable.       COMPARISON:   CT abdomen and pelvis 07/08/2021.       HISTORY:   ORDERING SYSTEM PROVIDED HISTORY: abd pain, nausea, h/o of cystic fibrosis,   h/o of abnormal appendix on ct 2021   TECHNOLOGIST PROVIDED HISTORY:   Additional Contrast?->None   Reason for exam:->abd pain, nausea, h/o of cystic fibrosis, h/o of abnormal   appendix on ct 2021   Decision Support Exception - unselect if not a suspected or confirmed   emergency medical condition->Emergency Medical Condition (MA)   Reason for Exam: abd pain, nausea, h/o of cystic fibrosis, h/o of abnormal   appendix on ct 2021       FINDINGS:   Lower Chest: Mild bibasilar atelectasis.       Organs:  The liver and gallbladder are unremarkable.  No biliary ductal   dilatation.  Chronic absence of pancreatic tail.  Calcification in the distal   pancreatic body.  Mild stranding about the pancreatic body also seen.  Normal   enhancement of parenchyma.  No peripancreatic fluid collection

## 2023-03-31 NOTE — ED PROVIDER NOTES
fibrosis (HCC) and Migraine. Past Surgical History:   Procedure Laterality Date    TYMPANOSTOMY TUBE PLACEMENT         History reviewed. No pertinent family history. Social History     Tobacco Use    Smoking status: Some Days     Packs/day: 1.00     Types: Cigarettes    Smokeless tobacco: Never   Vaping Use    Vaping Use: Never used   Substance Use Topics    Alcohol use: No    Drug use: Yes     Types: Marijuana ([place]eline), Cocaine       Menthol [menthol], Pcn [penicillins], Aleve [naproxen], and Neosporin [neomycin-polymyx-gramicid]    The patients home medications have been reviewed. Discharge Medication List as of 3/31/2023  4:38 PM        CONTINUE these medications which have NOT CHANGED    Details   acetaminophen (TYLENOL) 500 MG tablet Take 2 tablets by mouth 3 times daily, Disp-60 tablet, R-0Print               SCREENINGS        Jensen Coma Scale  Eye Opening: Spontaneous  Best Verbal Response: Oriented  Best Motor Response: Obeys commands  Jensen Coma Scale Score: 15                CIWA Assessment  BP: 124/84  Heart Rate: 81             PHYSICAL EXAM       ED Triage Vitals [03/31/23 1250]   BP Temp Temp src Heart Rate Resp SpO2 Height Weight   (!) 119/96 98 °F (36.7 °C) -- 92 18 100 % 5' 6\" (1.676 m) 185 lb (83.9 kg)       Physical Exam  Vitals and nursing note reviewed. Constitutional:       Appearance: Normal appearance. He is well-developed. He is not ill-appearing or diaphoretic. HENT:      Head: Normocephalic and atraumatic. Eyes:      General: No scleral icterus. Right eye: No discharge. Left eye: No discharge. Cardiovascular:      Rate and Rhythm: Normal rate and regular rhythm. Heart sounds: Normal heart sounds. No murmur heard. No friction rub. No gallop. Pulmonary:      Effort: Pulmonary effort is normal. No respiratory distress. Breath sounds: Normal breath sounds. No stridor. No wheezing or rales. Chest:      Chest wall: No tenderness.    Abdominal: and wanted to know the day of his diagnosis with Cystic Fibrosis. I explained that I would need to pull his paper chart to get the exact date but that he was a . He said his mother remember that he was an infant but she did not know anything else. I took the opportunity to see how he was feeling. Per Vandana Segura, he is healthy and has not needed to be admitted. We have not seen him since  in Pulmonary Clinic. I was able to have him schedule an appointment with Dr. Frankie Muñoz. He said that he does not feel comfortable seeing the adults physicians at this time. Vandana Segura is also seen at [facility] in [address] and is treated for ADHD and BiPolar. He has an interview with Social Security in the next two weeks. He will sign release of information for medical records at that time. I will see him at his next appointment and continue to be available as needed. YASSINE Issa, [facility]  Department of Social Work  Beeper # [phone] [address] [place] [address] Visit  2011  [facility]  - documented in this encounter  Sima Zaragoza MD - 2011 1540 EDTDiagnoses for this visit:   1. CF (cystic fibrosis) (277. 00A)     Impression and Recommendations:   I have reviewed Julio C's interval phone records, labs, and nursing care plans (when applicable). Impression: I am a little concerned about the current status of Andress cystic fibrosis. He is having symptoms of a mild exacerbation. Plans for this visit included a course of Bactrim. A respiratory culture was sent today, as was a screening CXR. Patient is asked to: Follow-up: Return in about 3 months (around 10/25/2011). . Thank you for allowing me to assist in the care of Jeffrey Wilson. Please do not hesitate to contact me if I can be of further assistance. I am the Primary Clinician of Record.       PROCEDURES   Unless otherwise noted  none     Procedures    CRITICAL CARE TIME (.cctime)

## 2023-03-31 NOTE — DISCHARGE INSTRUCTIONS
Return to ED with any worsening abdominal pain, fever, uncontrollable vomiting, chest pain, shortness of breath. Follow up with the gastroenterologist and pulmonologist to discuss your pancreatitis and cystic fibrosis.

## 2023-03-31 NOTE — ED NOTES
Pt requesting to leave at this time, pt frustrated with being unable \"to just go outside and smoke in my car. \" TRACEY roberson notified, to head to bedside to speak with pt about test results.       Coretta Souza RN  03/31/23 5411

## 2024-03-19 ENCOUNTER — APPOINTMENT (OUTPATIENT)
Dept: GENERAL RADIOLOGY | Age: 29
End: 2024-03-19
Payer: MEDICAID

## 2024-03-19 ENCOUNTER — HOSPITAL ENCOUNTER (EMERGENCY)
Age: 29
Discharge: LEFT BEFORE DISCHARGE | End: 2024-03-19
Attending: STUDENT IN AN ORGANIZED HEALTH CARE EDUCATION/TRAINING PROGRAM
Payer: MEDICAID

## 2024-03-19 VITALS
SYSTOLIC BLOOD PRESSURE: 126 MMHG | HEIGHT: 66 IN | OXYGEN SATURATION: 93 % | DIASTOLIC BLOOD PRESSURE: 80 MMHG | RESPIRATION RATE: 17 BRPM | TEMPERATURE: 98 F | HEART RATE: 79 BPM | BODY MASS INDEX: 29.73 KG/M2 | WEIGHT: 185 LBS

## 2024-03-19 DIAGNOSIS — R06.02 SHORTNESS OF BREATH: Primary | ICD-10-CM

## 2024-03-19 LAB
ALBUMIN SERPL-MCNC: 4 GM/DL (ref 3.4–5)
ALP BLD-CCNC: 119 IU/L (ref 40–129)
ALT SERPL-CCNC: 42 U/L (ref 10–40)
ANION GAP SERPL CALCULATED.3IONS-SCNC: 10 MMOL/L (ref 7–16)
AST SERPL-CCNC: 28 IU/L (ref 15–37)
BASOPHILS ABSOLUTE: 0 K/CU MM
BASOPHILS RELATIVE PERCENT: 0.3 % (ref 0–1)
BILIRUB SERPL-MCNC: 0.3 MG/DL (ref 0–1)
BUN SERPL-MCNC: 13 MG/DL (ref 6–23)
CALCIUM SERPL-MCNC: 8.9 MG/DL (ref 8.3–10.6)
CHLORIDE BLD-SCNC: 104 MMOL/L (ref 99–110)
CO2: 24 MMOL/L (ref 21–32)
CREAT SERPL-MCNC: 0.9 MG/DL (ref 0.9–1.3)
DIFFERENTIAL TYPE: ABNORMAL
EOSINOPHILS ABSOLUTE: 0.3 K/CU MM
EOSINOPHILS RELATIVE PERCENT: 2.5 % (ref 0–3)
GFR SERPL CREATININE-BSD FRML MDRD: >60 ML/MIN/1.73M2
GLUCOSE SERPL-MCNC: 122 MG/DL (ref 70–99)
HCT VFR BLD CALC: 47.6 % (ref 42–52)
HEMOGLOBIN: 15.8 GM/DL (ref 13.5–18)
IMMATURE NEUTROPHIL %: 0.3 % (ref 0–0.43)
LYMPHOCYTES ABSOLUTE: 2.8 K/CU MM
LYMPHOCYTES RELATIVE PERCENT: 28.2 % (ref 24–44)
MAGNESIUM: 1.8 MG/DL (ref 1.8–2.4)
MCH RBC QN AUTO: 28.3 PG (ref 27–31)
MCHC RBC AUTO-ENTMCNC: 33.2 % (ref 32–36)
MCV RBC AUTO: 85.2 FL (ref 78–100)
MONOCYTES ABSOLUTE: 0.7 K/CU MM
MONOCYTES RELATIVE PERCENT: 7 % (ref 0–4)
NUCLEATED RBC %: 0 %
PDW BLD-RTO: 12.4 % (ref 11.7–14.9)
PLATELET # BLD: 272 K/CU MM (ref 140–440)
PMV BLD AUTO: 9.4 FL (ref 7.5–11.1)
POTASSIUM SERPL-SCNC: 4 MMOL/L (ref 3.5–5.1)
PRO-BNP: <36 PG/ML
RBC # BLD: 5.59 M/CU MM (ref 4.6–6.2)
SEGMENTED NEUTROPHILS ABSOLUTE COUNT: 6.1 K/CU MM
SEGMENTED NEUTROPHILS RELATIVE PERCENT: 61.7 % (ref 36–66)
SODIUM BLD-SCNC: 138 MMOL/L (ref 135–145)
TOTAL IMMATURE NEUTOROPHIL: 0.03 K/CU MM
TOTAL NUCLEATED RBC: 0 K/CU MM
TOTAL PROTEIN: 6.8 GM/DL (ref 6.4–8.2)
WBC # BLD: 9.8 K/CU MM (ref 4–10.5)

## 2024-03-19 PROCEDURE — 80053 COMPREHEN METABOLIC PANEL: CPT

## 2024-03-19 PROCEDURE — 71045 X-RAY EXAM CHEST 1 VIEW: CPT

## 2024-03-19 PROCEDURE — 99284 EMERGENCY DEPT VISIT MOD MDM: CPT

## 2024-03-19 PROCEDURE — 85025 COMPLETE CBC W/AUTO DIFF WBC: CPT

## 2024-03-19 PROCEDURE — 83735 ASSAY OF MAGNESIUM: CPT

## 2024-03-19 PROCEDURE — 83880 ASSAY OF NATRIURETIC PEPTIDE: CPT

## 2024-03-19 ASSESSMENT — LIFESTYLE VARIABLES
HOW OFTEN DO YOU HAVE A DRINK CONTAINING ALCOHOL: NEVER
HOW MANY STANDARD DRINKS CONTAINING ALCOHOL DO YOU HAVE ON A TYPICAL DAY: PATIENT DOES NOT DRINK

## 2024-03-19 NOTE — ED NOTES
Pt removed blood pressure cuff at this time. States that it is causing his arm to go numb. Pt advised to put the cuff back on but states that he would rather not.

## 2024-04-29 ENCOUNTER — HOSPITAL ENCOUNTER (OUTPATIENT)
Dept: GENERAL RADIOLOGY | Age: 29
Discharge: HOME | End: 2024-04-29
Attending: ORTHOPAEDIC SURGERY

## 2024-04-29 DIAGNOSIS — Z00.6 EXAMINATION FOR NORMAL COMPARISON OR CONTROL IN CLINICAL RESEARCH: ICD-10-CM

## 2024-04-30 ENCOUNTER — OFFICE VISIT (OUTPATIENT)
Dept: ORTHOPEDIC SURGERY | Age: 29
End: 2024-04-30
Payer: MEDICAID

## 2024-04-30 ENCOUNTER — PREP FOR PROCEDURE (OUTPATIENT)
Dept: ORTHOPEDIC SURGERY | Age: 29
End: 2024-04-30

## 2024-04-30 ENCOUNTER — TELEPHONE (OUTPATIENT)
Dept: ORTHOPEDIC SURGERY | Age: 29
End: 2024-04-30

## 2024-04-30 VITALS — RESPIRATION RATE: 17 BRPM | TEMPERATURE: 97.9 F | OXYGEN SATURATION: 98 % | HEART RATE: 90 BPM

## 2024-04-30 DIAGNOSIS — S52.022A CLOSED FRACTURE OF OLECRANON PROCESS OF LEFT ULNA, INITIAL ENCOUNTER: Primary | ICD-10-CM

## 2024-04-30 PROBLEM — S52.032A: Status: ACTIVE | Noted: 2024-04-30

## 2024-04-30 PROCEDURE — 99203 OFFICE O/P NEW LOW 30 MIN: CPT | Performed by: ORTHOPAEDIC SURGERY

## 2024-04-30 PROCEDURE — 4004F PT TOBACCO SCREEN RCVD TLK: CPT | Performed by: ORTHOPAEDIC SURGERY

## 2024-04-30 PROCEDURE — G8427 DOCREV CUR MEDS BY ELIG CLIN: HCPCS | Performed by: ORTHOPAEDIC SURGERY

## 2024-04-30 PROCEDURE — G8419 CALC BMI OUT NRM PARAM NOF/U: HCPCS | Performed by: ORTHOPAEDIC SURGERY

## 2024-04-30 RX ORDER — SODIUM CHLORIDE 9 MG/ML
INJECTION, SOLUTION INTRAVENOUS PRN
Status: CANCELLED | OUTPATIENT
Start: 2024-04-30

## 2024-04-30 RX ORDER — SODIUM CHLORIDE 0.9 % (FLUSH) 0.9 %
5-40 SYRINGE (ML) INJECTION EVERY 12 HOURS SCHEDULED
Status: CANCELLED | OUTPATIENT
Start: 2024-04-30

## 2024-04-30 RX ORDER — SODIUM CHLORIDE 0.9 % (FLUSH) 0.9 %
5-40 SYRINGE (ML) INJECTION PRN
Status: CANCELLED | OUTPATIENT
Start: 2024-04-30

## 2024-04-30 RX ORDER — HYDROCODONE BITARTRATE AND ACETAMINOPHEN 5; 325 MG/1; MG/1
1 TABLET ORAL EVERY 8 HOURS PRN
Qty: 20 TABLET | Refills: 0 | Status: SHIPPED | OUTPATIENT
Start: 2024-04-30 | End: 2024-05-07

## 2024-04-30 ASSESSMENT — ENCOUNTER SYMPTOMS
EYE PAIN: 0
CHEST TIGHTNESS: 0
EYE REDNESS: 0
VOMITING: 0
WHEEZING: 0
SHORTNESS OF BREATH: 0
COLOR CHANGE: 0

## 2024-04-30 NOTE — PATIENT INSTRUCTIONS
Continue weight-bearing as tolerated.  Continue range of motion exercises as instructed.  Ice and elevate as needed.  Tylenol or Motrin for pain.  We will schedule surgery at your convenience. If you have any questions regarding your surgery, please call our office and ask to speak with Betzaida [phone].

## 2024-04-30 NOTE — TELEPHONE ENCOUNTER
Scheduled patient for:    Open Reduction Internal Fixation of Left Olecranon  CPT: 50165  ICD 10: S52.032A  Surgery Date: 5/6/2024  Surgeon: Destiny  Facility: [facility]  Anesthesia: General  Product: Synthes and C/arm    No Surgery Clearance request sent out, patient states he does not have a PCP    Insurance: [place] [employer]  Per [facility] Provider Online Lookup Tool  CPT 48733-Oszy Not Require Auth

## 2024-04-30 NOTE — PROGRESS NOTES
Patient seen in office today for: Left elbow fracture DOI 4/26/2024, struck by shovel in the left elbow. Hx of similar fracture in the past. He is splint but wrist hand ROM, , and strength are still intact.     Patient reports 8/10 pain after taking Norco more than 30 mins ago.Pain worsened by: Patient reports painful ROM & weight bearing.     Profession: Tree service    Right handed
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
## 2024-05-01 NOTE — PROGRESS NOTES
5/1/24 - .LM with my call-back # concerning  surgery @ [facility] on  5/6/24.  Please call the PAT Nurse for a phone assessment and surgery instructions.

## 2024-05-01 NOTE — PROGRESS NOTES
.Surgery @ [facility] on 5/6/24 you will be called 5/3/24 with times    NOTHING TO EAT OR DRINK AFTER MIDNIGHT DAY OF SURGERY    1. Enter thru the hospital main entrance on day of surgery, check in at the Information Desk. If you arrive prior to 6:00am, enter thru the ER entrance.    2. Follow the directions as prescribed by the doctor for your procedure and medications.         Morning of surgery take: No medications         Stop vitamins, supplements and NSAIDS:  NOW    3. Check with your Doctor regarding stopping blood thinners and follow their instructions.    4. Do not smoke, vape or use chewing tobacco morning of surgery. Do not drink any alcoholic beverages 24 hours prior to surgery.       This includes NA Beer. No street drugs 7 days prior to surgery. No marijuana 24-48 hours prior to surgery.    5. If you have dentures, contacts of glasses they will be removed before going to the OR; please bring a case.    6. Please bring picture ID, insurance card, paperwork from the doctor’s office (H & P, Consent, & card for implantable devices).    7. Take a shower with an antibacterial soap the night before surgery and the morning of surgery. Do not put anything on your skin      After your morning shower.    8. You will need a responsible adult to drive you home and check on you after surgery.

## 2024-05-03 NOTE — PROGRESS NOTES
5/3/24 - Notified patient surgery @ [facility] on  5/6/24 @ 1030, arrival 0830. NPO status and morning medications reviewed. Understanding verbalized.

## 2024-05-04 ENCOUNTER — ANESTHESIA EVENT (OUTPATIENT)
Dept: OPERATING ROOM | Age: 29
End: 2024-05-04
Payer: MEDICAID

## 2024-05-04 NOTE — ANESTHESIA PRE PROCEDURE
Department of Anesthesiology  Preprocedure Note       Name:  Julio C Subramanian   Age:  28 y.o.  :  1995                                          MRN:  [id number]         Date:  2024      Surgeon: Surgeon(s):  Fabio Dixon MD    Procedure: Procedure(s):  OLECRANON OPEN REDUCTION INTERNAL FIXATION    Medications prior to admission:   Prior to Admission medications    Medication Sig Start Date End Date Taking? Authorizing Provider   HYDROcodone-acetaminophen (NORCO) 5-325 MG per tablet Take 1 tablet by mouth every 8 hours as needed for Pain for up to 7 days. Intended supply: 7 days. Take lowest dose possible to manage pain Max Daily Amount: 3 tablets 24  Fabio Dixon MD   ondansetron (ZOFRAN-ODT) 4 MG disintegrating tablet Take 1 tablet by mouth 3 times daily as needed for Nausea or Vomiting 3/31/23   Rober Crain PA-C   dicyclomine (BENTYL) 10 MG capsule Take 1 capsule by mouth 4 times daily  Patient not taking: Reported on 2024 3/31/23   Rober Crain PA-C   pantoprazole (PROTONIX) 20 MG tablet Take 2 tablets by mouth daily  Patient not taking: Reported on 2024 3/31/23   Rober Crain PA-C   acetaminophen (TYLENOL) 500 MG tablet Take 2 tablets by mouth 3 times daily 19   Bubba Gomez MD       Current medications:    No current facility-administered medications for this encounter.     Current Outpatient Medications   Medication Sig Dispense Refill   • HYDROcodone-acetaminophen (NORCO) 5-325 MG per tablet Take 1 tablet by mouth every 8 hours as needed for Pain for up to 7 days. Intended supply: 7 days. Take lowest dose possible to manage pain Max Daily Amount: 3 tablets 20 tablet 0   • ondansetron (ZOFRAN-ODT) 4 MG disintegrating tablet Take 1 tablet by mouth 3 times daily as needed for Nausea or Vomiting 21 tablet 0   • dicyclomine (BENTYL) 10 MG capsule Take 1 capsule by mouth 4 times daily (Patient not taking: Reported on 2024) 120 capsule 0   •

## 2024-05-06 ENCOUNTER — HOSPITAL ENCOUNTER (OUTPATIENT)
Age: 29
Setting detail: OUTPATIENT SURGERY
Discharge: HOME | End: 2024-05-06
Attending: ORTHOPAEDIC SURGERY | Admitting: ORTHOPAEDIC SURGERY
Payer: MEDICAID

## 2024-05-06 ENCOUNTER — ANESTHESIA (OUTPATIENT)
Dept: OPERATING ROOM | Age: 29
End: 2024-05-06
Payer: MEDICAID

## 2024-05-06 ENCOUNTER — APPOINTMENT (OUTPATIENT)
Dept: GENERAL RADIOLOGY | Age: 29
End: 2024-05-06
Attending: ORTHOPAEDIC SURGERY
Payer: MEDICAID

## 2024-05-06 VITALS
DIASTOLIC BLOOD PRESSURE: 72 MMHG | SYSTOLIC BLOOD PRESSURE: 115 MMHG | HEIGHT: 66 IN | WEIGHT: 185 LBS | TEMPERATURE: 97.6 F | BODY MASS INDEX: 29.73 KG/M2 | HEART RATE: 71 BPM | OXYGEN SATURATION: 93 % | RESPIRATION RATE: 16 BRPM

## 2024-05-06 DIAGNOSIS — S52.032A CLOSED DISPLACED INTRA-ARTICULAR FRACTURE OF OLECRANON PROCESS OF LEFT ULNA, INITIAL ENCOUNTER: Primary | ICD-10-CM

## 2024-05-06 LAB
AMPHETAMINES: NEGATIVE
ANION GAP SERPL CALCULATED.3IONS-SCNC: 12 MMOL/L (ref 7–16)
BARBITURATE SCREEN URINE: NEGATIVE
BASOPHILS ABSOLUTE: 0.1 K/CU MM
BASOPHILS RELATIVE PERCENT: 0.7 % (ref 0–1)
BENZODIAZEPINE SCREEN, URINE: NEGATIVE
BUN SERPL-MCNC: 15 MG/DL (ref 6–23)
CALCIUM SERPL-MCNC: 9.1 MG/DL (ref 8.3–10.6)
CANNABINOID SCREEN URINE: ABNORMAL
CHLORIDE BLD-SCNC: 102 MMOL/L (ref 99–110)
CO2: 23 MMOL/L (ref 21–32)
COCAINE METABOLITE: NEGATIVE
CREAT SERPL-MCNC: 0.9 MG/DL (ref 0.9–1.3)
DIFFERENTIAL TYPE: ABNORMAL
EOSINOPHILS ABSOLUTE: 0.2 K/CU MM
EOSINOPHILS RELATIVE PERCENT: 2.1 % (ref 0–3)
FENTANYL URINE: NEGATIVE
GFR, ESTIMATED: >90 ML/MIN/1.73M2
GLUCOSE SERPL-MCNC: 167 MG/DL (ref 70–99)
HCT VFR BLD CALC: 44.4 % (ref 42–52)
HEMOGLOBIN: 15.2 GM/DL (ref 13.5–18)
IMMATURE NEUTROPHIL %: 0.4 % (ref 0–0.43)
LYMPHOCYTES ABSOLUTE: 1.6 K/CU MM
LYMPHOCYTES RELATIVE PERCENT: 14.5 % (ref 24–44)
MCH RBC QN AUTO: 29.2 PG (ref 27–31)
MCHC RBC AUTO-ENTMCNC: 34.2 % (ref 32–36)
MCV RBC AUTO: 85.2 FL (ref 78–100)
MONOCYTES ABSOLUTE: 1 K/CU MM
MONOCYTES RELATIVE PERCENT: 9.2 % (ref 0–4)
NEUTROPHILS ABSOLUTE: 7.8 K/CU MM
NEUTROPHILS RELATIVE PERCENT: 73.1 % (ref 36–66)
NUCLEATED RBC %: 0 %
OPIATES, URINE: ABNORMAL
OXYCODONE: NEGATIVE
PDW BLD-RTO: 12.2 % (ref 11.7–14.9)
PLATELET # BLD: 279 K/CU MM (ref 140–440)
PMV BLD AUTO: 9.8 FL (ref 7.5–11.1)
POTASSIUM SERPL-SCNC: 4 MMOL/L (ref 3.5–5.1)
RBC # BLD: 5.21 M/CU MM (ref 4.6–6.2)
SODIUM BLD-SCNC: 137 MMOL/L (ref 135–145)
TOTAL IMMATURE NEUTOROPHIL: 0.04 K/CU MM
TOTAL NUCLEATED RBC: 0 K/CU MM
WBC # BLD: 10.7 K/CU MM (ref 4–10.5)

## 2024-05-06 PROCEDURE — 7100000001 HC PACU RECOVERY - ADDTL 15 MIN: Performed by: ORTHOPAEDIC SURGERY

## 2024-05-06 PROCEDURE — 6360000002 HC RX W HCPCS: Performed by: ORTHOPAEDIC SURGERY

## 2024-05-06 PROCEDURE — 3700000000 HC ANESTHESIA ATTENDED CARE: Performed by: ORTHOPAEDIC SURGERY

## 2024-05-06 PROCEDURE — 80307 DRUG TEST PRSMV CHEM ANLYZR: CPT

## 2024-05-06 PROCEDURE — 7100000011 HC PHASE II RECOVERY - ADDTL 15 MIN: Performed by: ORTHOPAEDIC SURGERY

## 2024-05-06 PROCEDURE — 6370000000 HC RX 637 (ALT 250 FOR IP): Performed by: ANESTHESIOLOGY

## 2024-05-06 PROCEDURE — 7100000000 HC PACU RECOVERY - FIRST 15 MIN: Performed by: ORTHOPAEDIC SURGERY

## 2024-05-06 PROCEDURE — 6360000002 HC RX W HCPCS: Performed by: NURSE ANESTHETIST, CERTIFIED REGISTERED

## 2024-05-06 PROCEDURE — 2500000003 HC RX 250 WO HCPCS: Performed by: NURSE ANESTHETIST, CERTIFIED REGISTERED

## 2024-05-06 PROCEDURE — 2580000003 HC RX 258: Performed by: ANESTHESIOLOGY

## 2024-05-06 PROCEDURE — 71046 X-RAY EXAM CHEST 2 VIEWS: CPT

## 2024-05-06 PROCEDURE — 24685 OPTX ULNAR FX PROX END W/FIX: CPT | Performed by: ORTHOPAEDIC SURGERY

## 2024-05-06 PROCEDURE — 76000 FLUOROSCOPY <1 HR PHYS/QHP: CPT

## 2024-05-06 PROCEDURE — 80048 BASIC METABOLIC PNL TOTAL CA: CPT

## 2024-05-06 PROCEDURE — 2709999900 HC NON-CHARGEABLE SUPPLY: Performed by: ORTHOPAEDIC SURGERY

## 2024-05-06 PROCEDURE — 3700000001 HC ADD 15 MINUTES (ANESTHESIA): Performed by: ORTHOPAEDIC SURGERY

## 2024-05-06 PROCEDURE — 85025 COMPLETE CBC W/AUTO DIFF WBC: CPT

## 2024-05-06 PROCEDURE — 24685 OPTX ULNAR FX PROX END W/FIX: CPT

## 2024-05-06 PROCEDURE — C1713 ANCHOR/SCREW BN/BN,TIS/BN: HCPCS | Performed by: ORTHOPAEDIC SURGERY

## 2024-05-06 PROCEDURE — 3600000005 HC SURGERY LEVEL 5 BASE: Performed by: ORTHOPAEDIC SURGERY

## 2024-05-06 PROCEDURE — 2720000010 HC SURG SUPPLY STERILE: Performed by: ORTHOPAEDIC SURGERY

## 2024-05-06 PROCEDURE — 3600000015 HC SURGERY LEVEL 5 ADDTL 15MIN: Performed by: ORTHOPAEDIC SURGERY

## 2024-05-06 PROCEDURE — 2580000003 HC RX 258: Performed by: ORTHOPAEDIC SURGERY

## 2024-05-06 PROCEDURE — 7100000010 HC PHASE II RECOVERY - FIRST 15 MIN: Performed by: ORTHOPAEDIC SURGERY

## 2024-05-06 PROCEDURE — 6360000002 HC RX W HCPCS: Performed by: ANESTHESIOLOGY

## 2024-05-06 DEVICE — SCREW BNE L22MM DIA2.7MM CORT S STL ST LOK FULL THRD T8: Type: IMPLANTABLE DEVICE | Status: FUNCTIONAL

## 2024-05-06 DEVICE — PLATE BNE L90MM 2 H ST L OLECRANON S STL LO PROF VAR ANG: Type: IMPLANTABLE DEVICE | Status: FUNCTIONAL

## 2024-05-06 DEVICE — SCREW BNE L20MM DIA3.5MM CORT S STL ST NONCANNULATED LOK: Type: IMPLANTABLE DEVICE | Status: FUNCTIONAL

## 2024-05-06 DEVICE — SCREW BNE L24MM DIA3.5MM CORT S STL ST NONCANNULATED LOK: Type: IMPLANTABLE DEVICE | Status: FUNCTIONAL

## 2024-05-06 DEVICE — SCREW BNE L10MM DIA2.7MM CORT S STL ST LOK FULL THRD T8: Type: IMPLANTABLE DEVICE | Status: FUNCTIONAL

## 2024-05-06 DEVICE — IMPLANTABLE DEVICE: Type: IMPLANTABLE DEVICE | Status: FUNCTIONAL

## 2024-05-06 DEVICE — SCREW BNE L24MM DIA2.7MM CORT S STL ST LOK FULL THRD T8: Type: IMPLANTABLE DEVICE | Status: FUNCTIONAL

## 2024-05-06 DEVICE — SCREW BNE L20MM DIA2.7MM CORT S STL ST LOK FULL THRD T8: Type: IMPLANTABLE DEVICE | Status: FUNCTIONAL

## 2024-05-06 RX ORDER — FENTANYL CITRATE 50 UG/ML
INJECTION, SOLUTION INTRAMUSCULAR; INTRAVENOUS PRN
Status: DISCONTINUED | OUTPATIENT
Start: 2024-05-06 | End: 2024-05-06 | Stop reason: SDUPTHER

## 2024-05-06 RX ORDER — ONDANSETRON 2 MG/ML
INJECTION INTRAMUSCULAR; INTRAVENOUS PRN
Status: DISCONTINUED | OUTPATIENT
Start: 2024-05-06 | End: 2024-05-06 | Stop reason: SDUPTHER

## 2024-05-06 RX ORDER — PROPOFOL 10 MG/ML
INJECTION, EMULSION INTRAVENOUS PRN
Status: DISCONTINUED | OUTPATIENT
Start: 2024-05-06 | End: 2024-05-06 | Stop reason: SDUPTHER

## 2024-05-06 RX ORDER — SODIUM CHLORIDE, SODIUM LACTATE, POTASSIUM CHLORIDE, CALCIUM CHLORIDE 600; 310; 30; 20 MG/100ML; MG/100ML; MG/100ML; MG/100ML
INJECTION, SOLUTION INTRAVENOUS CONTINUOUS
Status: DISCONTINUED | OUTPATIENT
Start: 2024-05-06 | End: 2024-05-06 | Stop reason: HOSPADM

## 2024-05-06 RX ORDER — SODIUM CHLORIDE 0.9 % (FLUSH) 0.9 %
5-40 SYRINGE (ML) INJECTION EVERY 12 HOURS SCHEDULED
Status: DISCONTINUED | OUTPATIENT
Start: 2024-05-06 | End: 2024-05-06 | Stop reason: HOSPADM

## 2024-05-06 RX ORDER — SODIUM CHLORIDE 0.9 % (FLUSH) 0.9 %
5-40 SYRINGE (ML) INJECTION PRN
Status: DISCONTINUED | OUTPATIENT
Start: 2024-05-06 | End: 2024-05-06 | Stop reason: HOSPADM

## 2024-05-06 RX ORDER — DROPERIDOL 2.5 MG/ML
0.62 INJECTION, SOLUTION INTRAMUSCULAR; INTRAVENOUS EVERY 10 MIN PRN
Status: DISCONTINUED | OUTPATIENT
Start: 2024-05-06 | End: 2024-05-06 | Stop reason: HOSPADM

## 2024-05-06 RX ORDER — SODIUM CHLORIDE 9 MG/ML
INJECTION, SOLUTION INTRAVENOUS PRN
Status: DISCONTINUED | OUTPATIENT
Start: 2024-05-06 | End: 2024-05-06 | Stop reason: HOSPADM

## 2024-05-06 RX ORDER — DEXMEDETOMIDINE HYDROCHLORIDE 100 UG/ML
INJECTION, SOLUTION INTRAVENOUS PRN
Status: DISCONTINUED | OUTPATIENT
Start: 2024-05-06 | End: 2024-05-06 | Stop reason: SDUPTHER

## 2024-05-06 RX ORDER — LABETALOL HYDROCHLORIDE 5 MG/ML
10 INJECTION, SOLUTION INTRAVENOUS
Status: DISCONTINUED | OUTPATIENT
Start: 2024-05-06 | End: 2024-05-06 | Stop reason: HOSPADM

## 2024-05-06 RX ORDER — LIDOCAINE HYDROCHLORIDE 20 MG/ML
INJECTION, SOLUTION INTRAVENOUS PRN
Status: DISCONTINUED | OUTPATIENT
Start: 2024-05-06 | End: 2024-05-06 | Stop reason: SDUPTHER

## 2024-05-06 RX ORDER — ONDANSETRON 2 MG/ML
4 INJECTION INTRAMUSCULAR; INTRAVENOUS
Status: DISCONTINUED | OUTPATIENT
Start: 2024-05-06 | End: 2024-05-06 | Stop reason: HOSPADM

## 2024-05-06 RX ORDER — MIDAZOLAM HYDROCHLORIDE 1 MG/ML
INJECTION INTRAMUSCULAR; INTRAVENOUS PRN
Status: DISCONTINUED | OUTPATIENT
Start: 2024-05-06 | End: 2024-05-06 | Stop reason: SDUPTHER

## 2024-05-06 RX ORDER — OXYCODONE HYDROCHLORIDE 5 MG/1
5 TABLET ORAL
Status: COMPLETED | OUTPATIENT
Start: 2024-05-06 | End: 2024-05-06

## 2024-05-06 RX ORDER — DIPHENHYDRAMINE HYDROCHLORIDE 50 MG/ML
12.5 INJECTION INTRAMUSCULAR; INTRAVENOUS
Status: DISCONTINUED | OUTPATIENT
Start: 2024-05-06 | End: 2024-05-06 | Stop reason: HOSPADM

## 2024-05-06 RX ORDER — DEXAMETHASONE SODIUM PHOSPHATE 4 MG/ML
INJECTION, SOLUTION INTRA-ARTICULAR; INTRALESIONAL; INTRAMUSCULAR; INTRAVENOUS; SOFT TISSUE PRN
Status: DISCONTINUED | OUTPATIENT
Start: 2024-05-06 | End: 2024-05-06 | Stop reason: SDUPTHER

## 2024-05-06 RX ORDER — SODIUM CHLORIDE, SODIUM LACTATE, POTASSIUM CHLORIDE, CALCIUM CHLORIDE 600; 310; 30; 20 MG/100ML; MG/100ML; MG/100ML; MG/100ML
INJECTION, SOLUTION INTRAVENOUS ONCE
Status: DISCONTINUED | OUTPATIENT
Start: 2024-05-06 | End: 2024-05-06 | Stop reason: HOSPADM

## 2024-05-06 RX ORDER — HYDRALAZINE HYDROCHLORIDE 20 MG/ML
10 INJECTION INTRAMUSCULAR; INTRAVENOUS
Status: DISCONTINUED | OUTPATIENT
Start: 2024-05-06 | End: 2024-05-06 | Stop reason: HOSPADM

## 2024-05-06 RX ORDER — OXYCODONE HYDROCHLORIDE AND ACETAMINOPHEN 5; 325 MG/1; MG/1
1 TABLET ORAL EVERY 6 HOURS PRN
Qty: 28 TABLET | Refills: 0 | Status: SHIPPED | OUTPATIENT
Start: 2024-05-06 | End: 2024-05-13

## 2024-05-06 RX ORDER — BUPIVACAINE HYDROCHLORIDE 5 MG/ML
INJECTION, SOLUTION PERINEURAL
Status: COMPLETED | OUTPATIENT
Start: 2024-05-06 | End: 2024-05-06

## 2024-05-06 RX ORDER — FENTANYL CITRATE 50 UG/ML
25 INJECTION, SOLUTION INTRAMUSCULAR; INTRAVENOUS EVERY 5 MIN PRN
Status: DISCONTINUED | OUTPATIENT
Start: 2024-05-06 | End: 2024-05-06 | Stop reason: HOSPADM

## 2024-05-06 RX ORDER — NALOXONE HYDROCHLORIDE 0.4 MG/ML
INJECTION, SOLUTION INTRAMUSCULAR; INTRAVENOUS; SUBCUTANEOUS PRN
Status: DISCONTINUED | OUTPATIENT
Start: 2024-05-06 | End: 2024-05-06 | Stop reason: HOSPADM

## 2024-05-06 RX ADMIN — DEXAMETHASONE SODIUM PHOSPHATE 8 MG: 4 INJECTION, SOLUTION INTRAMUSCULAR; INTRAVENOUS at 11:00

## 2024-05-06 RX ADMIN — SODIUM CHLORIDE, POTASSIUM CHLORIDE, SODIUM LACTATE AND CALCIUM CHLORIDE: 600; 310; 30; 20 INJECTION, SOLUTION INTRAVENOUS at 09:08

## 2024-05-06 RX ADMIN — FENTANYL CITRATE 25 MCG: 50 INJECTION, SOLUTION INTRAMUSCULAR; INTRAVENOUS at 10:54

## 2024-05-06 RX ADMIN — CEFAZOLIN 2000 MG: 2 INJECTION, POWDER, FOR SOLUTION INTRAMUSCULAR; INTRAVENOUS at 11:01

## 2024-05-06 RX ADMIN — HYDROMORPHONE HYDROCHLORIDE 0.5 MG: 1 INJECTION, SOLUTION INTRAMUSCULAR; INTRAVENOUS; SUBCUTANEOUS at 11:21

## 2024-05-06 RX ADMIN — PROPOFOL 200 MG: 10 INJECTION, EMULSION INTRAVENOUS at 10:54

## 2024-05-06 RX ADMIN — SODIUM CHLORIDE, POTASSIUM CHLORIDE, SODIUM LACTATE AND CALCIUM CHLORIDE: 600; 310; 30; 20 INJECTION, SOLUTION INTRAVENOUS at 12:26

## 2024-05-06 RX ADMIN — DEXMEDETOMIDINE 8 MCG: 100 INJECTION, SOLUTION INTRAVENOUS at 10:50

## 2024-05-06 RX ADMIN — PROPOFOL 50 MG: 10 INJECTION, EMULSION INTRAVENOUS at 11:00

## 2024-05-06 RX ADMIN — DEXMEDETOMIDINE 12 MCG: 100 INJECTION, SOLUTION INTRAVENOUS at 11:20

## 2024-05-06 RX ADMIN — OXYCODONE HYDROCHLORIDE 5 MG: 5 TABLET ORAL at 13:48

## 2024-05-06 RX ADMIN — FENTANYL CITRATE 50 MCG: 50 INJECTION, SOLUTION INTRAMUSCULAR; INTRAVENOUS at 11:18

## 2024-05-06 RX ADMIN — FENTANYL CITRATE 25 MCG: 50 INJECTION, SOLUTION INTRAMUSCULAR; INTRAVENOUS at 11:20

## 2024-05-06 RX ADMIN — HYDROMORPHONE HYDROCHLORIDE 0.5 MG: 1 INJECTION, SOLUTION INTRAMUSCULAR; INTRAVENOUS; SUBCUTANEOUS at 13:19

## 2024-05-06 RX ADMIN — DEXMEDETOMIDINE 12 MCG: 100 INJECTION, SOLUTION INTRAVENOUS at 12:19

## 2024-05-06 RX ADMIN — ONDANSETRON 4 MG: 2 INJECTION INTRAMUSCULAR; INTRAVENOUS at 11:00

## 2024-05-06 RX ADMIN — DEXMEDETOMIDINE 4 MCG: 100 INJECTION, SOLUTION INTRAVENOUS at 12:32

## 2024-05-06 RX ADMIN — LIDOCAINE HYDROCHLORIDE 80 MG: 20 INJECTION, SOLUTION INTRAVENOUS at 10:54

## 2024-05-06 RX ADMIN — DEXMEDETOMIDINE 4 MCG: 100 INJECTION, SOLUTION INTRAVENOUS at 11:10

## 2024-05-06 RX ADMIN — PROPOFOL 50 MG: 10 INJECTION, EMULSION INTRAVENOUS at 11:18

## 2024-05-06 RX ADMIN — HYDROMORPHONE HYDROCHLORIDE 0.5 MG: 1 INJECTION, SOLUTION INTRAMUSCULAR; INTRAVENOUS; SUBCUTANEOUS at 13:12

## 2024-05-06 RX ADMIN — MIDAZOLAM 2 MG: 1 INJECTION INTRAMUSCULAR; INTRAVENOUS at 11:13

## 2024-05-06 ASSESSMENT — PAIN DESCRIPTION - DESCRIPTORS
DESCRIPTORS: ACHING

## 2024-05-06 ASSESSMENT — PAIN DESCRIPTION - LOCATION
LOCATION: ARM

## 2024-05-06 ASSESSMENT — PAIN - FUNCTIONAL ASSESSMENT
PAIN_FUNCTIONAL_ASSESSMENT: PREVENTS OR INTERFERES SOME ACTIVE ACTIVITIES AND ADLS
PAIN_FUNCTIONAL_ASSESSMENT: PREVENTS OR INTERFERES SOME ACTIVE ACTIVITIES AND ADLS
PAIN_FUNCTIONAL_ASSESSMENT: 0-10
PAIN_FUNCTIONAL_ASSESSMENT: PREVENTS OR INTERFERES SOME ACTIVE ACTIVITIES AND ADLS

## 2024-05-06 ASSESSMENT — PAIN SCALES - GENERAL
PAINLEVEL_OUTOF10: 9
PAINLEVEL_OUTOF10: 10
PAINLEVEL_OUTOF10: 9
PAINLEVEL_OUTOF10: 9
PAINLEVEL_OUTOF10: 5

## 2024-05-06 ASSESSMENT — PAIN DESCRIPTION - ONSET
ONSET: ON-GOING

## 2024-05-06 ASSESSMENT — PAIN DESCRIPTION - PAIN TYPE
TYPE: SURGICAL PAIN

## 2024-05-06 ASSESSMENT — PAIN DESCRIPTION - ORIENTATION
ORIENTATION: LEFT

## 2024-05-06 ASSESSMENT — PAIN DESCRIPTION - FREQUENCY
FREQUENCY: CONTINUOUS

## 2024-05-06 NOTE — PROGRESS NOTES
1255- pt received from OR. Monitors placed and alarms on. Report received from Shira GIRALDO. Oral airway in place upon arrival to PACU.  1305- pt resting comfortably.   1310- oral airway removed.  1312- pt medicated for complaints of pain.  1317- pt repositioned in bed.  1319- pt medicated for complaints of pain.  1330- pt resting comfortably. States pain is now tolerable and denies any nausea.   1333- pt transported to [facility] by RN and bedside report given to Arlyn FARR

## 2024-05-06 NOTE — OP NOTE
Operative Note      Patient: Julio C Subramanian  YOB: 1995  MRN: [id number]    Date of Procedure: 5/6/2024    Pre-Op Diagnosis Codes:     * Displaced fracture of olecranon process with intraarticular extension of left ulna [S52.032A]    Post-Op Diagnosis: Same       Procedure(s):  OLECRANON OPEN REDUCTION INTERNAL FIXATION    Surgeon(s):  Fabio Dixon MD    Assistant:   Physician Assistant: Rober Hopkins PA-C Greg Mann was present for the entirety of the procedure and vital for the performance of the procedure. Tomas Hopkins assisted with positioning, prepping, draping of the patient before the procedure and instrument manipulation, extremity repositioning during the procedure as well as wound closure, dressing application and brace application after the procedure. Please note that no intern, resident, or other hospital staff was available to assist during the surgery      Anesthesia: General    Estimated Blood Loss (mL): less than 100     Complications: None    Specimens:   * No specimens in log *    Implants:  Implant Name Type Inv. Item Serial No.  Lot No. LRB No. Used Action   PLATE BNE L90MM 2 H ST L OLECRANON S STL LO PROF YANG ANG - [id number]  PLATE BNE L90MM 2 H ST L OLECRANON S STL LO PROF YANG ANG  DEPUY [employer] [place]-WD [id number] Left 1 Implanted   SCREW BNE L24MM DIA2.7MM MOHINI S STL ST AIDAN FULL THRD T8 - [id number]  SCREW BNE L24MM DIA2.7MM MOHINI S STL ST AIDAN FULL THRD T8  DEPUY [employer]-WD  Left 2 Implanted   SCREW BNE L20MM DIA3.5MM MOHINI S STL ST NONCANNULATED AIDAN - [id number]  SCREW BNE L20MM DIA3.5MM MOHINI S STL ST NONCANNULATED AIDAN  DEPUY [employer]-WD  Left 1 Implanted   SCREW BNE L24MM DIA3.5MM MOHINI S STL ST NONCANNULATED AIDAN - [id number]  SCREW BNE L24MM DIA3.5MM MOHINI S STL ST NONCANNULATED AIDAN  DEPUY [employer]-WD  Left 1 Implanted   SCREW BNE L20MM DIA2.7MM MOHINI S STL ST AIDAN FULL THRD T8 - [id number]  SCREW BNE L20MM DIA2.7MM MOHINI S STL ST AIDAN FULL THRD T8  DEPUY

## 2024-05-06 NOTE — PROGRESS NOTES
1335 - Received patient from pacu, report received from Araceli FARR, patient A&O, declines nausea, reports pain 9/10, family at bedside, call light with in reach, given pepsi and crackers, patient able to move fingers on left hand, dressing dry  1348 - patient given pain med - see mar  1420 - discharge instructions given to patient, wife, and mother, understanding voiced without any further questions at this time.  1425 - VSS, patient denies nausea, reports pain unchanged, patient satisfied able to move fingers on left hand.  1429 - Iv removed  1430 - patient dressing  1435 - patient left sds via wheelchair accompanied by roverto

## 2024-05-06 NOTE — DISCHARGE INSTRUCTIONS
Keep postoperative splint and dressings intact and dry.  You may gently loosen or rewrap the Ace wrap as needed    Okay to do gentle range of motion and light activities with the hand and wrist.    No lifting pushing pulling or weightbearing with the left upper extremity.    Elevate as needed to help reduce pain and swelling.    Take pain medication as needed.    Follow-up in the office on 5/16/2024 for x-rays and staple removal    Call the office sooner if there are any concerns for worsening pain, swelling, fevers, or other medical issues.          [facility]  [phone]    Do not drive, work around machines or use equipment.  Do not drink any alcoholic beverages.  Do not smoke while alone.  Avoid making important decisions.  Plan to spend a quiet, relaxed evening @ home.  Resume normal activities as you begin to feel better.  Eat lightly for your first meal, then gradually increase your diet to what is normal for you.  In case of nausea, avoid food and drink only clear liquids.  Resume food as nausea ceases.  Notify your surgeon if you experience fever, chills, large amount of bleeding, difficulty breathing, persistent nausea and vomiting or any other disturbing problem.  Call for a follow-up appointment with your surgeon.

## 2024-05-06 NOTE — H&P
nervous/anxious.                                                  Examination:  General Exam:  Vitals: Pulse 90   Temp 97.9 °F (36.6 °C)   Resp 17   SpO2 98%    Physical Exam  Vitals and nursing note reviewed.   Constitutional:       Appearance: Normal appearance.   HENT:      Head: Normocephalic and atraumatic.   Eyes:      Conjunctiva/sclera: Conjunctivae normal.      Pupils: Pupils are equal, round, and reactive to light.   Pulmonary:      Effort: Pulmonary effort is normal.   Musculoskeletal:      Left shoulder: No tenderness. Normal range of motion. Normal strength.      Right elbow: No swelling, deformity, effusion or lacerations. Normal range of motion. No radial head, medial epicondyle, lateral epicondyle or olecranon process tenderness.      Left wrist: No swelling, deformity, effusion or tenderness. Normal range of motion.      Cervical back: Normal range of motion.      Comments: Left upper extremity:  Splint was removed.  There is severe tenderness palpation diffusely to the elbow involving primarily the posterior aspect.  Severe ecchymosis present throughout the medial and posterior elbow.  Range of motion deferred due to fracture.  Sensation motor functions intact throughout the upper extremity including the median, ulnar, radial nerve distribution.  2+ radial pulse        Skin:     General: Skin is warm and dry.   Neurological:      Mental Status: He is alert and oriented to person, place, and time.   Psychiatric:         Mood and Affect: Mood normal.         Behavior: Behavior normal.               Diagnostic testing:  X-ray images were reviewed by myself and discussed with the patient:  X-ray images of the left elbow show evidence of comminuted displaced olecranon intra-articular fracture with 15 mm of separation through the main fracture site.     There is evidence of chronic well-corticated fracture fragments adjacent to the medial epicondyle consistent with history of previous elbow fracture

## 2024-05-07 NOTE — ANESTHESIA POSTPROCEDURE EVALUATION
Department of Anesthesiology  Postprocedure Note    Patient: Julio C Subramanian  MRN: [id number]  YOB: 1995  Date of evaluation: 5/6/2024    Procedure Summary       Date: 05/06/24 Room / Location: [address]    Anesthesia Start: 1050 Anesthesia Stop: 1258    Procedure: OLECRANON OPEN REDUCTION INTERNAL FIXATION (Left) Diagnosis:       Displaced fracture of olecranon process with intraarticular extension of left ulna      (Displaced fracture of olecranon process with intraarticular extension of left ulna [S52.032A])    Surgeons: Fabio Dixon MD Responsible Provider: Ramana Buck MD    Anesthesia Type: general ASA Status: 2            Anesthesia Type: No value filed.    Wendy Phase I: Wendy Score: 10    Wendy Phase II: Wendy Score: 10    Anesthesia Post Evaluation    Patient location during evaluation: PACU  Patient participation: complete - patient participated  Level of consciousness: awake and alert  Pain score: 3  Airway patency: patent  Nausea & Vomiting: no nausea and no vomiting  Cardiovascular status: hemodynamically stable  Respiratory status: acceptable  Hydration status: euvolemic  Multimodal analgesia pain management approach  Pain management: adequate      No notable events documented.

## 2024-05-16 ENCOUNTER — OFFICE VISIT (OUTPATIENT)
Dept: ORTHOPEDIC SURGERY | Age: 29
End: 2024-05-16

## 2024-05-16 VITALS — HEART RATE: 71 BPM | TEMPERATURE: 97.6 F | OXYGEN SATURATION: 96 %

## 2024-05-16 DIAGNOSIS — S52.022A CLOSED FRACTURE OF OLECRANON PROCESS OF LEFT ULNA, INITIAL ENCOUNTER: Primary | ICD-10-CM

## 2024-05-16 NOTE — PATIENT INSTRUCTIONS
Follow up 3 weeks   Apply a compressive ACE bandage. Rest and elevate the affected painful area.  Apply cold compresses intermittently as needed.  As pain recedes, begin normal activities slowly as tolerated.  Call if symptoms persist.

## 2024-05-16 NOTE — PROGRESS NOTES
5/16/2024     3:00 PM   AMB PAIN ASSESSMENT   Location of Pain Elbow   Location Modifiers Left   Severity of Pain 3   Quality of Pain Aching;Sharp;Throbbing   Duration of Pain Persistent   Frequency of Pain Intermittent   Aggravating Factors Bending;Stretching;Straightening;Other (Comment)   Limiting Behavior Yes   Relieving Factors Rest   Work-Related Injury No   Are there other pain locations you wish to document? No   Pain level a 3/10    Pt here 2 week post op left elbow fx   Staples out today skin intact no drainage no redness   X ray done today

## 2024-06-06 ENCOUNTER — OFFICE VISIT (OUTPATIENT)
Dept: ORTHOPEDIC SURGERY | Age: 29
End: 2024-06-06

## 2024-06-06 VITALS — RESPIRATION RATE: 17 BRPM | HEART RATE: 64 BPM | OXYGEN SATURATION: 98 % | TEMPERATURE: 97.9 F

## 2024-06-06 DIAGNOSIS — Z09 POSTOP CHECK: ICD-10-CM

## 2024-06-06 DIAGNOSIS — S52.022D CLOSED FRACTURE OF OLECRANON PROCESS OF LEFT ULNA WITH ROUTINE HEALING, SUBSEQUENT ENCOUNTER: Primary | ICD-10-CM

## 2024-06-06 PROBLEM — S52.022A CLOSED FRACTURE OF LEFT OLECRANON PROCESS: Status: ACTIVE | Noted: 2024-06-06

## 2024-06-06 PROCEDURE — 99024 POSTOP FOLLOW-UP VISIT: CPT | Performed by: ORTHOPAEDIC SURGERY

## 2024-06-06 NOTE — PROGRESS NOTES
Patient seen in office today for: 5wk PO ORIF left elbow, DOS 5/6/2024    Patient reports 7/10 pain.  RICE and medication are not effective to alleviate pain and reduce swelling.     Pain worsened by: Patient reports painful ROM & weight bearing.     Xrays performed in office today.       Right handed

## 2024-06-06 NOTE — PROGRESS NOTES
6/6/2024   Chief Complaint   Patient presents with    Post-Op Check     5wk PO ORIF left elbow, DOS 5/6/2024        History of Present Illness:                             Julio C Subramanian is a 28 y.o. male who presents today for follow-up of his left elbow open reduction and internal fixation.  He is doing well with light activities and feels his elbow is making good progress.  No setbacks or problems today.  He feels that he would like to proceed with rehabilitation activities.    Patient seen in office today for: 5wk PO ORIF left elbow, DOS 5/6/2024     Patient reports 7/10 pain.  RICE and medication are not effective to alleviate pain and reduce swelling.      Pain worsened by: Patient reports painful ROM & weight bearing.      Xrays performed in office today.         Right handed      Medical History  Patient's medications, allergies, past medical, surgical, social and family histories were reviewed and updated as appropriate.      Review of Systems                                            Examination:  General Exam:  Vitals: Pulse 64   Temp 97.9 °F (36.6 °C)   Resp 17   SpO2 98%    Physical Exam     Left upper extremity:  Well-healed surgical scar to the posterior aspect of the elbow.  Painless active range of motion present the elbow with near full flexion and approximately 25 degrees short of full extension.  No pain with supination pronation forearm.  Sensation motor functions intact throughout.  Strength is 5/5 with elbow flexion and extension    Diagnostic testing:  X-rays reviewed in office, I independently reviewed the films in the office today:     XR ELBOW RIGHT STANDARD    Result Date: 6/6/2024  3 views of the right elbow show excellent alignment of the olecranon status post a reduction and internal fixation.  Mild interval healing present the fracture site.  Maintained alignment the articular surface of the elbow joint.  Stable position of the hardware. Impression: Status post ORIF of

## 2024-06-06 NOTE — PATIENT INSTRUCTIONS
Advance activities as tolerated  Ice and elevate as needed.  Tylenol or Motrin for pain.  Out patient Occupational Therapy has been ordered by your provider. [facility] will call you to set up therapy. If you have not heard from them within 24-48 hours of today's appointment, please reach out to them at [phone].  Follow up in 4 weeks

## 2024-06-12 ENCOUNTER — HOSPITAL ENCOUNTER (OUTPATIENT)
Dept: OCCUPATIONAL THERAPY | Age: 29
Setting detail: THERAPIES SERIES
Discharge: HOME | End: 2024-06-12
Payer: MEDICAID

## 2024-06-12 PROCEDURE — 97140 MANUAL THERAPY 1/> REGIONS: CPT

## 2024-06-12 PROCEDURE — 97110 THERAPEUTIC EXERCISES: CPT

## 2024-06-12 PROCEDURE — 97166 OT EVAL MOD COMPLEX 45 MIN: CPT

## 2024-06-12 NOTE — FLOWSHEET NOTE
[][facility]      [][address]     [address] [address] [phone] [phone] [phone]  ________________________________________________________________________  Occupational Therapy Daily Treatment Note    Date:  2024  Patient Name:  Julio C Subramanian    :  1995  Restrictions/Precautions:  General   Diagnosis:   ORIF L elbow/ olecranon   Treatment Diagnosis:  L elbow pain and stiffness   Insurance/Certification information:  [employer]  Referring Physician:   Dr. 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   Prior Level of Function:  Independent, working full time  Patient Goals: Return to PLOF    Treatment Flowsheet   Right Left     Hot pack  x     Gentle AAROM to elbow  x     Elbow PROM   x     Blue Digi flex (7#)  x     Elbow flex/ext w/ 1# weight   x                                               Interventions/Modalities used:  [x] Therapeutic Exercise   [] Modalities:  [] Therapeutic Activity    [] Ultrasound [] Elec Stimulation   [] Total Motion Release    [] Fluido [] Kinesiotaping  [] Neuromuscular Re-education   [] Ionto [x] Coldpack/hotpack   [] Instruction in HEP    Other: Manual     Objective Findings: Elbow flex/ext: 139/25, L  81.3    Communication with other providers: POC sent to referring physician     Education provided to patient: HEP, edema management, scar massage, gentle AROM     Adverse Reactions to treatment: n/a     Timed Code Treatment Minutes:  30    Total Treatment Minutes:  55    Treatment/Activity Tolerance:     [x]  Patient tolerated treatment well []  Patient

## 2024-06-12 NOTE — PLAN OF CARE
to 95# to support use of a functional grasp for return to work duties    Pt will decreased Quick Dash score to </= 10 to demonstrate a return to PLOF   Pt will demonstrate competency in HEP to support maintenance of therapeutic progress and continued functional success   Pt will improve AROM in the L elbow to WNL to support ability to perform daily/work tasks      Short Term Goals:    Pt will participate in ther-ex and ther-act to progress functional activity tolerance/endurance   Pt will decrease pain w/ activity to 1-2/10 to demonstrate increased functional activity tolerance   Pt will demonstrate competency w/ scar management as part of HEP   Pt will decrease L elbow edema by 1.5cm to support ability to extend elbow and participate in daily tasks   Pt will improve AROM of the L elbow by 20d to support ability to perform duties necessary for return to work      Electronically signed by:  Zehra Vega OT,OTR/L, 6/12/2024, 3:42 PM    If you have any questions or concerns, please don't hesitate to call.  Thank you for your referral.      Physician Signature:__________________   Date:_____________ Time: _______  By signing above, therapist’s plan is approved by physician
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
## 2024-06-14 ENCOUNTER — HOSPITAL ENCOUNTER (OUTPATIENT)
Dept: OCCUPATIONAL THERAPY | Age: 29
Setting detail: THERAPIES SERIES
Discharge: HOME | End: 2024-06-14
Payer: MEDICAID

## 2024-06-14 PROCEDURE — 97530 THERAPEUTIC ACTIVITIES: CPT

## 2024-06-14 PROCEDURE — 97110 THERAPEUTIC EXERCISES: CPT

## 2024-06-14 PROCEDURE — 97140 MANUAL THERAPY 1/> REGIONS: CPT

## 2024-06-14 NOTE — FLOWSHEET NOTE
limited by fatique    []  Patient limited by pain []  Patient limited by other medical complications   []  Other:     Long Term Goals:  Pt will decrease pain w/ activity to 0/10 to demonstrate increased functional activity tolerance   Pt will increase L  to 95# to support use of a functional grasp for ADL and IADL performance   Pt will decreased Quick Dash score to </= 10 to demonstrate a return to PLOF   Pt will demonstrate competency in HEP to support maintenance of therapeutic progress and continued functional success   Pt will improve AROM in the L elbow to WNL to support ability to perform daily/work tasks      Short Term Goals:    Pt will participate in ther-ex and ther-act to progress functional activity tolerance/endurance   Pt will decrease pain w/ activity to 2-3/10 to demonstrate increased functional activity tolerance   Pt will demonstrate competency w/ scar management as part of HEP   Pt will decrease L elbow edema by 1.5cm to support ability to extend elbow and participate in daily tasks   Pt will improve AROM of the L elbow by 20d to support ability to perform duties necessary for return to work       Patient Requires Follow-up:  [x]  Yes  []  No    Plan: [x]  Continue per plan of care []  Alter current plan (see comments)   [x]  Plan of care initiated []  Hold pending MD visit []  Discharge    Plan for Next Session:  Cont per OT POC     Electronically signed by:  Kelsey Santoro OT,TONYA/L, 6/14/2024, 4:51 PM

## 2024-06-18 ENCOUNTER — HOSPITAL ENCOUNTER (OUTPATIENT)
Dept: OCCUPATIONAL THERAPY | Age: 29
Setting detail: THERAPIES SERIES
Discharge: HOME | End: 2024-06-18
Payer: MEDICAID

## 2024-06-18 PROCEDURE — 97140 MANUAL THERAPY 1/> REGIONS: CPT

## 2024-06-18 PROCEDURE — 97110 THERAPEUTIC EXERCISES: CPT

## 2024-06-18 PROCEDURE — 97530 THERAPEUTIC ACTIVITIES: CPT

## 2024-06-18 NOTE — FLOWSHEET NOTE
[][facility]      [][address], [address]     [address] [address] [phone] [phone] [phone]  ________________________________________________________________________  Occupational Therapy Daily Treatment Note    Date:  2024  Patient Name:  Julio C Subramanian    :  1995  Restrictions/Precautions:  General   Diagnosis:   ORIF L elbow/ olecranon   Treatment Diagnosis:  L elbow pain and stiffness   Insurance/Certification information:  [employer]  Referring Physician:   Dr. Dixon   Plan of care signed (Y/N):    Visit# / total visits:  3/10  Pain level: 4/10     Subjective: States has had little places pop out stitches along incision. Healing well. No swelling or redness.  Prior Level of Function:  Independent, working full time  Patient Goals: Return to PLOF    Treatment Flowsheet   Right Left     Hot pack  x     Gentle AAROM to elbow  X sitting and supine.     Elbow PROM   x     Blue Digi flex (7#)  x     Elbow flex/ext w/ 1# weight   x     Wrist flex and ext 1#  x   Pinch pin 8#   x   Sup/pron with 8oz hammer  x                          Interventions/Modalities used:  [x] Therapeutic Exercise   [] Modalities:  [] Therapeutic Activity    [] Ultrasound [] Elec Stimulation   [] Total Motion Release    [] Fluido [] Kinesiotaping  [] Neuromuscular Re-education   [] Ionto [x] Coldpack/hotpack   [] Instruction in HEP    Other: Manual     Objective Findings: Elbow flex/ext: 138/22(20),     Communication with other providers: POC sent to referring physician     Education provided to patient: HEP, edema management, scar massage, gentle AROM     Adverse Reactions to treatment: n/a   Time in and out 1600 to 1645  Timed Code Treatment Minutes:  40    Total Treatment Minutes:  40    Treatment/Activity Tolerance:     [x]  Patient tolerated

## 2024-06-21 ENCOUNTER — HOSPITAL ENCOUNTER (OUTPATIENT)
Dept: OCCUPATIONAL THERAPY | Age: 29
Setting detail: THERAPIES SERIES
Discharge: HOME | End: 2024-06-21
Payer: MEDICAID

## 2024-06-21 PROCEDURE — 97140 MANUAL THERAPY 1/> REGIONS: CPT

## 2024-06-21 PROCEDURE — 97530 THERAPEUTIC ACTIVITIES: CPT

## 2024-06-21 PROCEDURE — 97110 THERAPEUTIC EXERCISES: CPT

## 2024-06-21 NOTE — FLOWSHEET NOTE
morenita    []  Patient limited by pain []  Patient limited by other medical complications   []  Other:     Long Term Goals:  Pt will decrease pain w/ activity to 0/10 to demonstrate increased functional activity tolerance   Pt will increase L  to 95# to support use of a functional grasp for ADL and IADL performance   Pt will decreased Quick Dash score to </= 10 to demonstrate a return to PLOF   Pt will demonstrate competency in HEP to support maintenance of therapeutic progress and continued functional success   Pt will improve AROM in the L elbow to WNL to support ability to perform daily/work tasks      Short Term Goals:    Pt will participate in ther-ex and ther-act to progress functional activity tolerance/endurance   Pt will decrease pain w/ activity to 2-3/10 to demonstrate increased functional activity tolerance   Pt will demonstrate competency w/ scar management as part of HEP   Pt will decrease L elbow edema by 1.5cm to support ability to extend elbow and participate in daily tasks   Pt will improve AROM of the L elbow by 20d to support ability to perform duties necessary for return to work       Patient Requires Follow-up:  [x]  Yes  []  No    Plan: [x]  Continue per plan of care []  Alter current plan (see comments)   [x]  Plan of care initiated []  Hold pending MD visit []  Discharge    Plan for Next Session:  Cont per OT POC     Electronically signed by:  Kelsey Santoro OT,TONYA/L, 6/21/2024, 3:22 PM

## 2024-06-24 ENCOUNTER — HOSPITAL ENCOUNTER (OUTPATIENT)
Dept: OCCUPATIONAL THERAPY | Age: 29
Setting detail: THERAPIES SERIES
Discharge: HOME | End: 2024-06-24
Payer: MEDICAID

## 2024-06-24 PROCEDURE — 97110 THERAPEUTIC EXERCISES: CPT

## 2024-06-24 PROCEDURE — 97140 MANUAL THERAPY 1/> REGIONS: CPT

## 2024-06-24 NOTE — FLOWSHEET NOTE
[][facility]      [][address]     [address] [address] [phone] [phone] [phone]  ________________________________________________________________________  Occupational Therapy Daily Treatment Note    Date:  2024  Patient Name:  Julio C Subramanian    :  1995  Restrictions/Precautions:  General   Diagnosis:   ORIF L elbow/ olecranon   Treatment Diagnosis:  L elbow pain and stiffness   Insurance/Certification information:  [employer]  Referring Physician:   Dr. Dixon   Plan of care signed (Y/N):    Visit# / total visits:  4/10  Pain level:  -7/10      Subjective: Having more pain today, reports it was bad when he first woke up, having sharp pains in his posterior elbow, following up with surgeon on , to ask about persistent bruising in forearm   Prior Level of Function:  Independent, working full time  Patient Goals: Return to PLOF    Treatment Flowsheet   Right Left     Hot pack  x     Gentle AAROM to elbow  X sitting and supine.     Elbow PROM   x     Blue Digi flex (7#)  x     Elbow flex/ext w/ 2# weight   x     Wrist flex and ext 2#  x   Pinch pin 8#   x   Sup/pron with 8oz hammer  x                          Interventions/Modalities used:  [x] Therapeutic Exercise   [] Modalities:  [] Therapeutic Activity    [] Ultrasound [] Elec Stimulation   [] Total Motion Release    [] Fluido [] Kinesiotaping  [] Neuromuscular Re-education   [] Ionto [x] Coldpack/hotpack   [] Instruction in HEP    Other: Manual     Objective Findings: Elbow flex/ext:  143/35, L  76 , L elbow edema 27cm     Communication with other providers: POC sent to referring physician     Education provided to patient: HEP, edema management, scar massage, gentle AROM     Adverse Reactions to treatment: n/a     Time in: 1525  Time Out:

## 2024-06-28 ENCOUNTER — HOSPITAL ENCOUNTER (OUTPATIENT)
Dept: OCCUPATIONAL THERAPY | Age: 29
Setting detail: THERAPIES SERIES
Discharge: HOME | End: 2024-06-28
Payer: MEDICAID

## 2024-06-28 PROCEDURE — 97530 THERAPEUTIC ACTIVITIES: CPT

## 2024-06-28 PROCEDURE — 97110 THERAPEUTIC EXERCISES: CPT

## 2024-06-28 PROCEDURE — 97140 MANUAL THERAPY 1/> REGIONS: CPT

## 2024-06-28 NOTE — FLOWSHEET NOTE
1640  Timed Code Treatment Minutes:  40  Total Treatment Minutes:  40    Treatment/Activity Tolerance:     [x]  Patient tolerated treatment well []  Patient limited by fatique    []  Patient limited by pain []  Patient limited by other medical complications   []  Other:     Long Term Goals:  Pt will decrease pain w/ activity to 0/10 to demonstrate increased functional activity tolerance   Pt will increase L  to 95# to support use of a functional grasp for ADL and IADL performance   Pt will decreased Quick Dash score to </= 10 to demonstrate a return to PLOF   Pt will demonstrate competency in HEP to support maintenance of therapeutic progress and continued functional success   Pt will improve AROM in the L elbow to WNL to support ability to perform daily/work tasks      Short Term Goals:    Pt will participate in ther-ex and ther-act to progress functional activity tolerance/endurance   Pt will decrease pain w/ activity to 2-3/10 to demonstrate increased functional activity tolerance   Pt will demonstrate competency w/ scar management as part of HEP   Pt will decrease L elbow edema by 1.5cm to support ability to extend elbow and participate in daily tasks   Pt will improve AROM of the L elbow by 20d to support ability to perform duties necessary for return to work       Patient Requires Follow-up:  [x]  Yes  []  No    Plan: [x]  Continue per plan of care []  Alter current plan (see comments)   [x]  Plan of care initiated []  Hold pending MD visit []  Discharge    Plan for Next Session:  Cont per OT POC     Electronically signed by:  Kelsey Santoro OT,TONYA/L, 6/28/2024, 4:51 PM

## 2024-07-01 ENCOUNTER — HOSPITAL ENCOUNTER (OUTPATIENT)
Dept: OCCUPATIONAL THERAPY | Age: 29
Setting detail: THERAPIES SERIES
Discharge: HOME | End: 2024-07-01
Payer: MEDICAID

## 2024-07-01 PROCEDURE — 97140 MANUAL THERAPY 1/> REGIONS: CPT

## 2024-07-01 PROCEDURE — 97530 THERAPEUTIC ACTIVITIES: CPT

## 2024-07-01 PROCEDURE — 97110 THERAPEUTIC EXERCISES: CPT

## 2024-07-01 NOTE — FLOWSHEET NOTE
[][facility]      [][address]     [address] [address] [phone] [phone] [phone]  ________________________________________________________________________  Occupational Therapy Daily Treatment Note    Date:  2024  Patient Name:  Julio C Subramanian    :  1995  Restrictions/Precautions:  General   Diagnosis:   ORIF L elbow/ olecranon   Treatment Diagnosis:  L elbow pain and stiffness   Insurance/Certification information:  [employer]  Referring Physician:   Dr. Dixon   Plan of care signed (Y/N):    Visit# / total visits:8 /10  Pain level:  5-6/10      Subjective: States flexion feels comparable to R arm  Prior Level of Function:  Independent, working full time  Patient Goals: Return to PLOF    Treatment Flowsheet   Right Left     Hot pack  x     Gentle AAROM to elbow  X      Elbow PROM   X resists at times     Blue Digi flex (7#)  x     Elbow flex/ext w/ 2# weight   x     Wrist flex and ext 2#  x   Pinch pin 8#   x   Sup/pron with 8oz hammer  x                          Interventions/Modalities used:  [x] Therapeutic Exercise   [] Modalities:  [] Therapeutic Activity    [] Ultrasound [] Elec Stimulation   [] Total Motion Release    [] Fluido [] Kinesiotaping  [] Neuromuscular Re-education   [] Ionto [x] Coldpack/hotpack   [] Instruction in HEP    Other: Manual     Objective Findings: Elbow flex/ext:  147/25 (20),     Communication with other providers: POC sent to referring physician     Education provided to patient: HEP, edema management, scar massage, gentle AROM     Adverse Reactions to treatment: n/a     Time in: 1600  Time Out: 1640  Timed Code Treatment Minutes:  40  Total Treatment Minutes:  40    Treatment/Activity Tolerance:     [x]  Patient tolerated treatment well []  Patient limited by fatique    []  Patient

## 2024-07-05 ENCOUNTER — HOSPITAL ENCOUNTER (OUTPATIENT)
Dept: OCCUPATIONAL THERAPY | Age: 29
Discharge: HOME | End: 2024-07-05

## 2024-07-05 NOTE — FLOWSHEET NOTE
Occupational Therapy  Cancellation/No-show Note  Patient Name:  Julio C Subramanian  :  1995   Date:  2024  Cancelled visits to date: 1  No-shows to date: 0    For today's appointment patient:  [x]    Cancelled  []    Rescheduled appointment  []    No-show     Reason given by patient:  []    Patient ill  []    Conflicting appointment  []    No transportation    []    Conflict with work  []    No reason given  [x]    Other:     Comments:  Puppy got out and trying to find it.    Electronically signed by:  Kelsey Santoro OT, OTR/L, 2024, 2:09 PM

## 2024-07-06 PROBLEM — Z09 POSTOP CHECK: Status: RESOLVED | Noted: 2024-06-06 | Resolved: 2024-07-06

## 2024-07-09 ENCOUNTER — OFFICE VISIT (OUTPATIENT)
Dept: ORTHOPEDIC SURGERY | Age: 29
End: 2024-07-09

## 2024-07-09 DIAGNOSIS — Z09 POSTOP CHECK: Primary | ICD-10-CM

## 2024-07-09 DIAGNOSIS — S52.022D CLOSED FRACTURE OF OLECRANON PROCESS OF LEFT ULNA WITH ROUTINE HEALING, SUBSEQUENT ENCOUNTER: ICD-10-CM

## 2024-07-09 PROCEDURE — 99024 POSTOP FOLLOW-UP VISIT: CPT | Performed by: ORTHOPAEDIC SURGERY

## 2024-07-09 NOTE — PATIENT INSTRUCTIONS
Continue weight-bearing as tolerated.  Continue range of motion exercises as instructed.  Progress with activities as tolerated.   Ice and elevate as needed.  Tylenol or Motrin for pain.  Follow up in 6 weeks

## 2024-07-10 ENCOUNTER — HOSPITAL ENCOUNTER (OUTPATIENT)
Dept: OCCUPATIONAL THERAPY | Age: 29
Setting detail: THERAPIES SERIES
Discharge: HOME | End: 2024-07-10
Payer: MEDICAID

## 2024-07-10 PROCEDURE — 97140 MANUAL THERAPY 1/> REGIONS: CPT

## 2024-07-10 PROCEDURE — 97110 THERAPEUTIC EXERCISES: CPT

## 2024-07-10 PROCEDURE — 97530 THERAPEUTIC ACTIVITIES: CPT

## 2024-07-10 NOTE — FLOWSHEET NOTE
[x]  Patient tolerated treatment well []  Patient limited by fatique    []  Patient limited by pain []  Patient limited by other medical complications   []  Other:     Long Term Goals:  Pt will decrease pain w/ activity to 0/10 to demonstrate increased functional activity tolerance   Pt will increase L  to 95# to support use of a functional grasp for ADL and IADL performance   Pt will decreased Quick Dash score to </= 10 to demonstrate a return to PLOF   Pt will demonstrate competency in HEP to support maintenance of therapeutic progress and continued functional success   Pt will improve AROM in the L elbow to WNL to support ability to perform daily/work tasks      Short Term Goals:    Pt will participate in ther-ex and ther-act to progress functional activity tolerance/endurance   Pt will decrease pain w/ activity to 2-3/10 to demonstrate increased functional activity tolerance   Pt will demonstrate competency w/ scar management as part of HEP   Pt will decrease L elbow edema by 1.5cm to support ability to extend elbow and participate in daily tasks   Pt will improve AROM of the L elbow by 20d to support ability to perform duties necessary for return to work       Patient Requires Follow-up:  [x]  Yes  []  No    Plan: [x]  Continue per plan of care []  Alter current plan (see comments)   [x]  Plan of care initiated []  Hold pending MD visit []  Discharge    Plan for Next Session:  Cont per OT POC     Electronically signed by:  Zehra Vega OT,TONYA/L, 7/10/2024, 2:38 PM

## 2024-07-12 ENCOUNTER — HOSPITAL ENCOUNTER (OUTPATIENT)
Dept: OCCUPATIONAL THERAPY | Age: 29
Setting detail: THERAPIES SERIES
Discharge: HOME | End: 2024-07-12
Payer: MEDICAID

## 2024-07-12 PROCEDURE — 97530 THERAPEUTIC ACTIVITIES: CPT

## 2024-07-12 PROCEDURE — 97110 THERAPEUTIC EXERCISES: CPT

## 2024-07-12 PROCEDURE — 97140 MANUAL THERAPY 1/> REGIONS: CPT

## 2024-07-12 NOTE — FLOWSHEET NOTE
[][facility]      [][address]     [address] [address] [phone] [phone] [phone]  ________________________________________________________________________  Occupational Therapy Daily Treatment Note    Date:  2024  Patient Name:  Julio C Subramanian    :  1995  Restrictions/Precautions:  General   Diagnosis:   ORIF L elbow/ olecranon   Treatment Diagnosis:  L elbow pain and stiffness   Insurance/Certification information:  P[employer]  Referring Physician:   Dr. Dixon   Plan of care signed (Y/N):    Visit# / total visits:10 /20   Pain level:  2-3/10      Subjective: Planned sessions increased to 20, insurance currently has approved 30, doesn't go back to the doctor until August, see below for update/assessment. Needs to reschedule appointments for next week   Prior Level of Function:  Independent, working full time  Patient Goals: Return to PLOF    Treatment Flowsheet   Right Left     Hot pack  x     Gentle AAROM to elbow  X      Elbow PROM   X      Blue Digi flex (7#)  x     Elbow flex/ext w/ 3# weight   x     Wrist flex and ext 3#  x   Pinch pin 8#      Sup/pron with 8oz hammer       Overheard pulley w/ emphasis on elbow ext  x     Scar massage  x     Bean bag toss w/ emphasis on elbow extension   x               Interventions/Modalities used:  [x] Therapeutic Exercise   [] Modalities:  [x] Therapeutic Activity    [] Ultrasound [] Elec Stimulation   [] Total Motion Release    [] Fluido [] Kinesiotaping  [] Neuromuscular Re-education   [] Ionto [x] Coldpack/hotpack   [] Instruction in HEP    Other: Manual     Objective Findings: Elbow ext 30 AROM, 20 PROM,  83.2,  L elbow circumference(edema) 26.9    Communication with other providers: POC sent to referring physician     Education provided to patient: HEP, edema

## 2024-07-15 NOTE — PROGRESS NOTES
7/9/2024   Chief Complaint   Patient presents with    Follow-up     9wk PO Left elbow, DOS 5/6/2024        History of Present Illness:                             Julio C Subramanian is a 28 y.o. male who returns today for follow-up of his left elbow.  He is noticing improvement in his function and range of motion.  He feels that his elbow has reasonable strength but continues to be stiff especially at extremes of motion.  No setbacks or problems today.    Patient seen in office today for: 9wk PO Left elbow, DOS 5/6/2024, losing feeling in left arm. Tingling and numbness near plate that comes and goes.      Patient reports 4/10 pain.  RICE and medication are effective to alleviate pain and reduce swelling. Pain worsened by: Patient reports painful ROM & weight bearing.      Patient continues in physical therapy      Xrays performed in office today.      Right handed    Medical History  Patient's medications, allergies, past medical, surgical, social and family histories were reviewed and updated as appropriate.      Review of Systems                                            Examination:  General Exam:  Vitals: There were no vitals taken for this visit.   Physical Exam     Left upper extremity:  Well-healed surgical scar over the posterior aspect of the elbow.  Smooth painless gentle active and passive range of motion of the elbow.  20 degree flexion contracture present.  Strength is 5 out of 5 with elbow flexion and extension    Diagnostic testing:  X-rays reviewed in office, I independently reviewed the films in the office today:     X-rays taken the office today show excellent healing and maintained alignment across the olecranon fracture with stable position of the hardware and chronic fracture at the epicondyles    Office Procedures:  No orders of the defined types were placed in this encounter.      Assessment and Plan  1.  Left elbow open reduction internal fixation olecranon    We reviewed his x-rays

## 2024-07-19 ENCOUNTER — HOSPITAL ENCOUNTER (OUTPATIENT)
Dept: OCCUPATIONAL THERAPY | Age: 29
Setting detail: THERAPIES SERIES
Discharge: HOME | End: 2024-07-19
Payer: MEDICAID

## 2024-07-19 PROCEDURE — 97110 THERAPEUTIC EXERCISES: CPT

## 2024-07-19 PROCEDURE — 97140 MANUAL THERAPY 1/> REGIONS: CPT

## 2024-07-19 NOTE — FLOWSHEET NOTE
[][facility]      [][address]     [address] [address] [phone] [phone] [phone]  ________________________________________________________________________  Occupational Therapy Daily Treatment Note    Date:  2024  Patient Name:  Julio C Subramanian    :  1995  Restrictions/Precautions:  General   Diagnosis:   ORIF L elbow/ olecranon   Treatment Diagnosis:  L elbow pain and stiffness   Insurance/Certification information:  Praveen  Referring Physician:   Dr. Dixon   Plan of care signed (Y/N):    Visit# / total visits:    Pain level:  2/10  at rest, 4/10 w/ movement, 0/10 at end of session     Subjective: Mis-remembered his time for his last appointment, hence missing the time. Feeling stiffer today, thinks it's from merary last night and holding his arm in a flexed position on his knee. Continues to have some white-heads forming along scar w/ discharge   Prior Level of Function:  Independent, working full time  Patient Goals: Return to PLOF    Treatment Flowsheet   Right Left     Hot pack  x     Gentle AAROM to elbow  X      Elbow PROM   X      Blue Digi flex (7#)  x     Elbow flex/ext, shoulder press w/ 3# weight   x     Wrist flex and ext 3#  x   Pinch pin 8#      Sup/pron with 8oz hammer       Overheard pulley w/ emphasis on elbow ext       Scar massage  x     Bean bag toss w/ emphasis on elbow extension      Gentle Joint distraction   x          Interventions/Modalities used:  [x] Therapeutic Exercise   [] Modalities:  [x] Therapeutic Activity    [] Ultrasound [] Elec Stimulation   [] Total Motion Release    [] Fluido [] Kinesiotaping  [] Neuromuscular Re-education   [] Ionto [x] Coldpack/hotpack   [] Instruction in HEP    Other: Manual     Objective Findings: Elbow ext 25(21),  93.0    Communication

## 2024-07-22 ENCOUNTER — HOSPITAL ENCOUNTER (OUTPATIENT)
Dept: OCCUPATIONAL THERAPY | Age: 29
Setting detail: THERAPIES SERIES
Discharge: HOME | End: 2024-07-22
Payer: MEDICAID

## 2024-07-22 PROCEDURE — 97110 THERAPEUTIC EXERCISES: CPT

## 2024-07-22 PROCEDURE — 97530 THERAPEUTIC ACTIVITIES: CPT

## 2024-07-22 PROCEDURE — 97140 MANUAL THERAPY 1/> REGIONS: CPT

## 2024-07-22 NOTE — FLOWSHEET NOTE
[][facility]      [][address]     [address] [address] [phone] [phone] [phone]  ________________________________________________________________________  Occupational Therapy Daily Treatment Note    Date:  2024  Patient Name:  Julio C Subramanian    :  1995  Restrictions/Precautions:  General   Diagnosis:   ORIF L elbow/ olecranon   Treatment Diagnosis:  L elbow pain and stiffness   Insurance/Certification information:  Pra[employer]en  Referring Physician:   Dr. Dixon   Plan of care signed (Y/N):    Visit# / total visits:    Pain level:  4/10  at rest, States was 6/10 this am    Subjective: States he forgot to say last time he was here that he had fallen on his arm the night before and Friday night his arm seized up 3 different times into his chest and his wife had to slowly pull his arm out from chest then straighten it for him. Has an inch long scrape on incision which is dried up. No pustules or redness noted.  Prior Level of Function:  Independent, working full time  Patient Goals: Return to PLOF    Treatment Flowsheet   Right Left     Hot pack  x     Gentle AAROM to elbow  X      Elbow PROM   X      Blue Digi flex (7#)  x     Elbow flex/ext, shoulder press w/ 3# weight   x     Wrist flex and ext 3#  x   Pinch pin 8#   x   Sup/pron with 8oz hammer       Overheard pulley w/ emphasis on elbow ext       Scar massage  x     Bean bag toss w/ emphasis on elbow extension      Gentle Joint distraction   x          Interventions/Modalities used:  [x] Therapeutic Exercise   [] Modalities:  [x] Therapeutic Activity    [] Ultrasound [] Elec Stimulation   [] Total Motion Release    [] Fluido [] Kinesiotaping  [] Neuromuscular Re-education   [] Ionto [x] Coldpack/hotpack   [] Instruction in HEP    Other: Manual

## 2024-07-24 ENCOUNTER — HOSPITAL ENCOUNTER (OUTPATIENT)
Dept: OCCUPATIONAL THERAPY | Age: 29
Setting detail: THERAPIES SERIES
Discharge: HOME | End: 2024-07-24
Payer: MEDICAID

## 2024-07-24 PROCEDURE — 97140 MANUAL THERAPY 1/> REGIONS: CPT

## 2024-07-24 PROCEDURE — 97530 THERAPEUTIC ACTIVITIES: CPT

## 2024-07-24 PROCEDURE — 97110 THERAPEUTIC EXERCISES: CPT

## 2024-07-24 NOTE — FLOWSHEET NOTE
Reactions to treatment: n/a     Time in: 1300  Time Out: 1345  Timed Code Treatment Minutes:  45  Total Treatment Minutes:  45    Treatment/Activity Tolerance:     [x]  Patient tolerated treatment well []  Patient limited by fatique    []  Patient limited by pain []  Patient limited by other medical complications   []  Other:     Long Term Goals:  Pt will decrease pain w/ activity to 0/10 to demonstrate increased functional activity tolerance Cont   Pt will increase L  to 95# to support use of a functional grasp for ADL and IADL performance Cont   Pt will decreased Quick Dash score to </= 10 to demonstrate a return to PLOF Cont   Pt will demonstrate competency in HEP to support maintenance of therapeutic progress and continued functional success Cont   Pt will improve AROM in the L elbow to WNL to support ability to perform daily/work tasks Cont      Short Term Goals:    Pt will participate in ther-ex and ther-act to progress functional activity tolerance/endurance Cont   Pt will decrease pain w/ activity to 2-3/10 to demonstrate increased functional activity tolerance Cont   Pt will demonstrate competency w/ scar management as part of HEP Met 7/12  Pt will decrease L elbow edema by 1.5cm to support ability to extend elbow and participate in daily tasks Cont   Pt will improve AROM of the L elbow by 20d to support ability to perform duties necessary for return to work  Cont      Patient Requires Follow-up:  [x]  Yes  []  No    Plan: [x]  Continue per plan of care []  Alter current plan (see comments)   [x]  Plan of care initiated []  Hold pending MD visit []  Discharge    Plan for Next Session:  Cont per OT POC     Electronically signed by:  Kelsey Santoro OT,TONYA/L, 7/24/2024, 4:10 PM

## 2024-07-29 ENCOUNTER — HOSPITAL ENCOUNTER (OUTPATIENT)
Dept: OCCUPATIONAL THERAPY | Age: 29
Discharge: HOME | End: 2024-07-29

## 2024-07-29 NOTE — PROGRESS NOTES
Occupational Therapy  Cancellation/No-show Note  Patient Name:  Julio C Subramanian  :  1995   Date:  2024  Cancelled visits to date: 2  No-shows to date: 2    For today's appointment patient:  [x]    Cancelled  []    Rescheduled appointment  []    No-show     Reason given by patient:  []    Patient ill  []    Conflicting appointment  [x]    No transportation    []    Conflict with work  []    No reason given  []    Other:     Comments:  Car won't start    Electronically signed by:  Kelsey Santoro OT, OTR/L, 2024, 4:35 PM

## 2024-08-02 ENCOUNTER — HOSPITAL ENCOUNTER (OUTPATIENT)
Dept: OCCUPATIONAL THERAPY | Age: 29
Setting detail: THERAPIES SERIES
Discharge: HOME | End: 2024-08-02
Payer: MEDICAID

## 2024-08-02 PROCEDURE — 97530 THERAPEUTIC ACTIVITIES: CPT

## 2024-08-02 PROCEDURE — 97140 MANUAL THERAPY 1/> REGIONS: CPT

## 2024-08-02 PROCEDURE — 97110 THERAPEUTIC EXERCISES: CPT

## 2024-08-02 NOTE — FLOWSHEET NOTE
[][facility]      [][address], [address]     [address] [address] [phone] [phone] [phone]  ________________________________________________________________________  Occupational Therapy Daily Treatment Note    Date:  2024  Patient Name:  Julio C Subramanian    :  1995  Restrictions/Precautions:  General   Diagnosis:   ORIF L elbow/ olecranon   Treatment Diagnosis:  L elbow pain and stiffness   Insurance/Certification information:  P[employer]veen  Referring Physician:   Dr. Dixon   Plan of care signed (Y/N):    Visit# / total visits:    Pain level:  4/10  at rest, States was 6/10 this am    Subjective: States is stays stiff.  Prior Level of Function:  Independent, working full time  Patient Goals: Return to PLOF    Treatment Flowsheet   Right Left     Hot pack  x     Gentle AAROM to elbow  X      Elbow PROM   X      Blue Digi flex (7#)  x     Elbow flex/ext, shoulder press w/ 3# weight   x     Wrist flex and ext 3#  x   Pinch pin 8#   x   Sup/pron with 8oz hammer       Overheard pulley w/ emphasis on elbow ext       Scar massage  x     Bean bag toss w/ emphasis on elbow extension      Gentle Joint distraction   x          Interventions/Modalities used:  [x] Therapeutic Exercise   [] Modalities:  [x] Therapeutic Activity    [] Ultrasound [] Elec Stimulation   [] Total Motion Release    [] Fluido [] Kinesiotaping  [] Neuromuscular Re-education   [] Ionto [x] Coldpack/hotpack   [] Instruction in HEP    Other: Manual     Objective Findings: Elbow ext 23(17),  89.3#  Improved end feel  Communication with other providers: POC sent to referring physician     Education provided to patient: HEP, edema management, scar massage, gentle AROM     Adverse Reactions to treatment: n/a     Time in: 1345  Time Out: 1430  Timed Code

## 2024-08-06 ENCOUNTER — HOSPITAL ENCOUNTER (OUTPATIENT)
Dept: OCCUPATIONAL THERAPY | Age: 29
Setting detail: THERAPIES SERIES
Discharge: HOME | End: 2024-08-06
Payer: MEDICAID

## 2024-08-06 PROCEDURE — 97110 THERAPEUTIC EXERCISES: CPT

## 2024-08-06 PROCEDURE — 97530 THERAPEUTIC ACTIVITIES: CPT

## 2024-08-06 PROCEDURE — 97140 MANUAL THERAPY 1/> REGIONS: CPT

## 2024-08-06 NOTE — FLOWSHEET NOTE
[][facility]      [][address], [address]     [address] [address] [phone] [phone] [phone]  ________________________________________________________________________  Occupational Therapy Daily Treatment Note    Date:  2024  Patient Name:  Julio C Subramanian    :  1995  Restrictions/Precautions:  General   Diagnosis:   ORIF L elbow/ olecranon   Treatment Diagnosis:  L elbow pain and stiffness   Insurance/Certification information:  Praveen  Referring Physician:   Dr. Dixon   Plan of care signed (Y/N):    Visit# / total visits: 15 /20   Pain level:  4/10  at rest, States was 6/10 this am    Subjective: States sees doctor on Thursday  Prior Level of Function:  Independent, working full time  Patient Goals: Return to PLOF    Treatment Flowsheet   Right Left     Hot pack  x     Gentle AAROM to elbow  X      Elbow PROM   X      Blue Digi flex (7#)  x     Elbow flex/ext, shoulder press w/ 3# weight   x     Wrist flex and ext 3#  x   Pinch pin 8#   x   Sup/pron with 8oz hammer       Overheard pulley w/ emphasis on elbow ext       Scar massage  x     Bean bag toss w/ emphasis on elbow extension      Gentle Joint distraction   x          Interventions/Modalities used:  [x] Therapeutic Exercise   [] Modalities:  [x] Therapeutic Activity    [] Ultrasound [] Elec Stimulation   [] Total Motion Release    [] Fluido [] Kinesiotaping  [] Neuromuscular Re-education   [] Ionto [x] Coldpack/hotpack   [] Instruction in HEP    Other: Manual     Objective Findings: Elbow ext 25(15) Flexion 148,  89.3#  Improved end feel  Communication with other providers: POC sent to referring physician     Education provided to patient: HEP, edema management, scar massage, gentle AROM     Adverse Reactions to treatment: n/a     Time in: 1620  Time Out:

## 2024-08-08 ENCOUNTER — OFFICE VISIT (OUTPATIENT)
Dept: ORTHOPEDIC SURGERY | Age: 29
End: 2024-08-08
Payer: MEDICAID

## 2024-08-08 VITALS — RESPIRATION RATE: 18 BRPM | TEMPERATURE: 97.9 F | HEART RATE: 90 BPM | OXYGEN SATURATION: 98 %

## 2024-08-08 DIAGNOSIS — S52.022D CLOSED FRACTURE OF OLECRANON PROCESS OF LEFT ULNA WITH ROUTINE HEALING, SUBSEQUENT ENCOUNTER: Primary | ICD-10-CM

## 2024-08-08 PROCEDURE — G8427 DOCREV CUR MEDS BY ELIG CLIN: HCPCS | Performed by: ORTHOPAEDIC SURGERY

## 2024-08-08 PROCEDURE — 4004F PT TOBACCO SCREEN RCVD TLK: CPT | Performed by: ORTHOPAEDIC SURGERY

## 2024-08-08 PROCEDURE — G8419 CALC BMI OUT NRM PARAM NOF/U: HCPCS | Performed by: ORTHOPAEDIC SURGERY

## 2024-08-08 PROCEDURE — 99212 OFFICE O/P EST SF 10 MIN: CPT | Performed by: ORTHOPAEDIC SURGERY

## 2024-08-08 ASSESSMENT — ENCOUNTER SYMPTOMS
CHEST TIGHTNESS: 0
COLOR CHANGE: 0
SHORTNESS OF BREATH: 0

## 2024-08-08 NOTE — PROGRESS NOTES
8/8/2024   Chief Complaint   Patient presents with    Follow-up     13wk PO Left elbow ORIF, DOS 5/6/2024        History of Present Illness:                             Julio C Subramanian is a 28 y.o. male returns today for follow-up of his left elbow.  He previously underwent open reduction and internal fixation of the elbow on 5/6/2024.  He has been doing therapy and feels that his elbow is making improvements with his strength and range of motion motion but continues to have some stiffness specially when trying to achieve terminal extension.    He denies any setbacks or problems but does have some mild discomfort along the hardware at the distal aspect of his plate.        Medical History  Patient's medications, allergies, past medical, surgical, social and family histories were reviewed and updated as appropriate.      Review of Systems   Constitutional:  Negative for activity change and fever.   Respiratory:  Negative for chest tightness and shortness of breath.    Cardiovascular:  Negative for chest pain.   Skin:  Negative for color change.   Neurological:  Negative for dizziness.   Psychiatric/Behavioral:  The patient is not nervous/anxious.                                                Examination:  General Exam:  Vitals: Pulse 90   Temp 97.9 °F (36.6 °C)   Resp 18   SpO2 98%    Physical Exam     Left upper extremity:  Well-healed surgical scar over the posterior aspect of the elbow.  Normal alignment of the elbow.  No pain or instability through active and passive range of motion and stress examination.  Mild tenderness to palpation at the distal aspect of the plate as it transitions to his olecranon shaft.  Sensation motor functions intact throughout.  Skin is intact.  No erythema, drainage, instability.    Diagnostic testing:  X-rays reviewed in office, I independently reviewed the films in the office today:   XR ELBOW LEFT STANDARD    Result Date: 8/8/2024  X-ray of the left elbow show well

## 2024-08-08 NOTE — PATIENT INSTRUCTIONS
Continue weight-bearing as tolerated.  Continue range of motion exercises as instructed.  Ice and elevate as needed.  Tylenol or Motrin for pain.  Continue physical therapy  Follow up as needed    We are committed to providing you the best care possible.     If you receive a survey after visiting one of our offices, please take time to share your experience concerning your physician office visit.  These surveys are confidential and no health information about you is shared.     We are eager to improve for you and we are counting on your feedback to help make that happen. If you felt my care was outstanding, please mention me by name: Yenni PEDERSON

## 2024-08-09 ENCOUNTER — HOSPITAL ENCOUNTER (OUTPATIENT)
Dept: OCCUPATIONAL THERAPY | Age: 29
Setting detail: THERAPIES SERIES
Discharge: HOME | End: 2024-08-09
Payer: MEDICAID

## 2024-08-09 PROCEDURE — 97110 THERAPEUTIC EXERCISES: CPT

## 2024-08-09 PROCEDURE — 97530 THERAPEUTIC ACTIVITIES: CPT

## 2024-08-09 PROCEDURE — 97140 MANUAL THERAPY 1/> REGIONS: CPT

## 2024-08-09 NOTE — FLOWSHEET NOTE
[][facility]      [][address]     [address] [address] [phone] [phone] [phone]  ________________________________________________________________________  Occupational Therapy Daily Treatment Note    Date:  2024  Patient Name:  Julio C Subramanian    :  1995  Restrictions/Precautions:  General   Diagnosis:   ORIF L elbow/ olecranon   Treatment Diagnosis:  L elbow pain and stiffness   Insurance/Certification information:  Praveen  Referring Physician:   Dr. Dixon   Plan of care signed (Y/N):    Visit# / total visits:    Pain level:  4/10  at rest, States was 6/10 this am    Subjective: States hurt when combined pronation with extension  Prior Level of Function:  Independent, working full time  Patient Goals: Return to PLOF    Treatment Flowsheet   Right Left     Hot pack  x     Gentle AAROM to elbow  X      Elbow PROM   X      Blue Digi flex (7#)  x     Elbow flex/ext, shoulder press w/ 3# weight   x     Wrist flex and ext 3#  x   Pinch pin 8#   x   Sup/pron with 8oz hammer       Overheard pulley w/ emphasis on elbow ext       Scar massage  x     Bean bag toss w/ emphasis on elbow extension      Gentle Joint distraction   x          Interventions/Modalities used:  [x] Therapeutic Exercise   [] Modalities:  [x] Therapeutic Activity    [] Ultrasound [] Elec Stimulation   [] Total Motion Release    [] Fluido [] Kinesiotaping  [] Neuromuscular Re-education   [] Ionto [x] Coldpack/hotpack   [] Instruction in HEP    Other: Manual     Objective Findings: Elbow ext 25(15) Flexion 148,  69.5#  Improved end feel  Communication with other providers: POC sent to referring physician     Education provided to patient: HEP, edema management, scar massage, gentle AROM     Adverse Reactions to treatment: n/a     Time in:

## 2024-08-12 ENCOUNTER — HOSPITAL ENCOUNTER (OUTPATIENT)
Dept: OCCUPATIONAL THERAPY | Age: 29
Setting detail: THERAPIES SERIES
Discharge: HOME | End: 2024-08-12
Payer: MEDICAID

## 2024-08-12 PROCEDURE — 97530 THERAPEUTIC ACTIVITIES: CPT

## 2024-08-12 PROCEDURE — 97140 MANUAL THERAPY 1/> REGIONS: CPT

## 2024-08-12 PROCEDURE — 97110 THERAPEUTIC EXERCISES: CPT

## 2024-08-12 NOTE — FLOWSHEET NOTE
[][facility]      [][address]     [address] [address] [phone] [phone] [phone]  ________________________________________________________________________  Occupational Therapy Daily Treatment Note    Date:  2024  Patient Name:  Julio C Subramanian    :  1995  Restrictions/Precautions:  General   Diagnosis:   ORIF L elbow/ olecranon   Treatment Diagnosis:  L elbow pain and stiffness   Insurance/Certification information:  Praveen  Referring Physician:   Dr. Dixon   Plan of care signed (Y/N):    Visit# / total visits:    Pain level:  4/10  at rest, States was 6/10 this am    Subjective: States feels like hardware is raising and hurting. No red areas.   Prior Level of Function:  Independent, working full time  Patient Goals: Return to PLOF    Treatment Flowsheet   Right Left     Hot pack with distal exercises  x     Gentle AAROM to elbow  X      Elbow PROM   X      Blue Digi flex (7#)  x     Elbow flex/ext, shoulder press w/ 3# weight   x     Wrist flex and ext 3#  x   Pinch pin 8#   x   Sup/pron with 8oz hammer       Overheard pulley w/ emphasis on elbow ext       Scar massage  x     Bean bag toss w/ emphasis on elbow extension      Gentle Joint distraction   x          Interventions/Modalities used:  [x] Therapeutic Exercise   [] Modalities:  [x] Therapeutic Activity    [] Ultrasound [] Elec Stimulation   [] Total Motion Release    [] Fluido [] Kinesiotaping  [] Neuromuscular Re-education   [] Ionto [x] Coldpack/hotpack   [] Instruction in HEP    Other: Manual     Objective Findings: Elbow ext 30(17) Flexion 148,  69.5#-last session  Improved end feel  Communication with other providers: POC sent to referring physician     Education provided to patient: HEP, edema management, scar massage, gentle AROM

## 2024-08-14 ENCOUNTER — HOSPITAL ENCOUNTER (OUTPATIENT)
Dept: OCCUPATIONAL THERAPY | Age: 29
Setting detail: THERAPIES SERIES
Discharge: HOME | End: 2024-08-14
Payer: MEDICAID

## 2024-08-14 PROCEDURE — 97140 MANUAL THERAPY 1/> REGIONS: CPT

## 2024-08-14 PROCEDURE — 97530 THERAPEUTIC ACTIVITIES: CPT

## 2024-08-14 PROCEDURE — 97110 THERAPEUTIC EXERCISES: CPT

## 2024-08-14 NOTE — FLOWSHEET NOTE
[][facility]      [][address]     [address] [address] [phone] [phone] [phone]  ________________________________________________________________________  Occupational Therapy Daily Treatment Note    Date:  2024  Patient Name:  Julio C Subramanian    :  1995  Restrictions/Precautions:  General   Diagnosis:   ORIF L elbow/ olecranon   Treatment Diagnosis:  L elbow pain and stiffness   Insurance/Certification information:  Praveen  Referring Physician:   Dr. Dixon   Plan of care signed (Y/N):    Visit# / total visits:    Pain level:  4/10  at rest, States was 6/10 this am    Subjective: States hurts on tip of elbow today.  Prior Level of Function:  Independent, working full time  Patient Goals: Return to PLOF    Treatment Flowsheet   Right Left     Hot pack with distal exercises  x     Gentle AAROM to elbow  X      Elbow PROM   X      Blue Digi flex (7#)  x     Elbow flex/ext, shoulder press w/ 3# weight   x     Wrist flex and ext 3#  x   Pinch pin 8#   x   Sup/pron with 8oz hammer  x     Overheard pulley w/ emphasis on elbow ext       Scar massage  x     Bean bag toss w/ emphasis on elbow extension      Gentle Joint distraction   x          Interventions/Modalities used:  [x] Therapeutic Exercise   [] Modalities:  [x] Therapeutic Activity    [] Ultrasound [] Elec Stimulation   [] Total Motion Release    [] Fluido [] Kinesiotaping  [] Neuromuscular Re-education   [] Ionto [x] Coldpack/hotpack   [] Instruction in HEP    Other: Manual     Objective Findings: Elbow ext 25(17) Flexion 148,  69.5#- Improved end feel  Communication with other providers: POC sent to referring physician     Education provided to patient: HEP, edema management, scar massage, gentle AROM     Adverse Reactions to treatment: n/a

## 2024-08-19 ENCOUNTER — HOSPITAL ENCOUNTER (OUTPATIENT)
Dept: OCCUPATIONAL THERAPY | Age: 29
Setting detail: THERAPIES SERIES
Discharge: HOME | End: 2024-08-19
Payer: MEDICAID

## 2024-08-19 PROCEDURE — 97140 MANUAL THERAPY 1/> REGIONS: CPT

## 2024-08-19 PROCEDURE — 97110 THERAPEUTIC EXERCISES: CPT

## 2024-08-19 PROCEDURE — 97530 THERAPEUTIC ACTIVITIES: CPT

## 2024-08-19 NOTE — FLOWSHEET NOTE
[][facility] [place]      [][address]     [address] [address] [phone] [phone] [phone]  ________________________________________________________________________  Occupational Therapy Daily Treatment Note    Date:  2024  Patient Name:  Julio C Subramanian    :  1995  Restrictions/Precautions:  General   Diagnosis:   ORIF L elbow/ olecranon   Treatment Diagnosis:  L elbow pain and stiffness   Insurance/Certification information:  Praveen  Referring Physician:   Dr. Dixon   Plan of care signed (Y/N):    Visit# / total visits:    Pain level:  2/10  at rest, States was 8/10 this am    Subjective: Elbow and end of plate area were giving him more trouble than normal this AM   Prior Level of Function:  Independent, working full time  Patient Goals: Return to PLOF    Treatment Flowsheet   Right Left     Hot pack with distal exercises  x     Gentle AAROM to elbow  X      Elbow PROM   X      Blue Digi flex (7#)  x     Elbow flex/ext, shoulder press w/ 3# weight   x     Wrist flex and ext 3#     Pinch pin 8#      Sup/pron with 8oz hammer       Overheard pulley w/ emphasis on elbow ext       Scar massage  x     Bean bag toss w/ emphasis on elbow extension      Gentle Joint distraction   x   Kettle bell carry   x     Interventions/Modalities used:  [x] Therapeutic Exercise   [] Modalities:  [x] Therapeutic Activity    [] Ultrasound [] Elec Stimulation   [] Total Motion Release    [] Fluido [] Kinesiotaping  [] Neuromuscular Re-education   [] Ionto [x] Coldpack/hotpack   [] Instruction in HEP    Other: Manual     Objective Findings: Elbow ext 20(14)    Communication with other providers: POC sent to referring physician     Education provided to patient: HEP, edema management, scar massage, gentle AROM     Adverse Reactions to

## 2024-08-21 ENCOUNTER — HOSPITAL ENCOUNTER (OUTPATIENT)
Dept: OCCUPATIONAL THERAPY | Age: 29
Discharge: HOME | End: 2024-08-21

## 2024-08-21 NOTE — FLOWSHEET NOTE
Occupational Therapy  Cancellation/No-show Note  Patient Name:  Julio C Subramanian  :  1995   Date:  2024  Cancelled visits to date: 2  No-shows to date: 1    For today's appointment patient:  [x]    Cancelled  []    Rescheduled appointment  []    No-show     Reason given by patient:  [x]    Patient ill  []    Conflicting appointment  []    No transportation    []    Conflict with work  []    No reason given  []    Other:     Comments:      Electronically signed by:  Kelsey Santoro OT, OTR/L, 2024, 2:51 PM

## 2024-08-26 ENCOUNTER — HOSPITAL ENCOUNTER (OUTPATIENT)
Dept: OCCUPATIONAL THERAPY | Age: 29
Setting detail: THERAPIES SERIES
Discharge: HOME | End: 2024-08-26
Payer: MEDICAID

## 2024-08-26 PROCEDURE — 97530 THERAPEUTIC ACTIVITIES: CPT

## 2024-08-26 PROCEDURE — 97110 THERAPEUTIC EXERCISES: CPT

## 2024-08-26 PROCEDURE — 97140 MANUAL THERAPY 1/> REGIONS: CPT

## 2024-08-26 NOTE — FLOWSHEET NOTE
[][facility]      [][address]     [address] [address] [phone] [phone] [phone]  ________________________________________________________________________  Occupational Therapy Daily Treatment Note    Date:  2024  Patient Name:  Julio C Subramanian    :  1995  Restrictions/Precautions:  General   Diagnosis:   ORIF L elbow/ olecranon   Treatment Diagnosis:  L elbow pain and stiffness   Insurance/Certification information:  Praveen  Referring Physician:   Dr. Dixon   Plan of care signed (Y/N):    Visit# / total visits:    Pain level:  7/10  at rest, having sharp pains at night that wake him up on the ulnar side of forearm near an old fracture site     Subjective: Discussed w/ pt potential discharge versus extending therapy next session, pt wishes to continue therapy. May trial decreasing to 1x a week   Prior Level of Function:  Independent, working full time  Patient Goals: Return to PLOF    Treatment Flowsheet   Right Left     Hot pack with distal exercises  x     Gentle AAROM to elbow  X      Elbow PROM   X      Blue Digi flex (7#)  x     Elbow flex/ext, shoulder press w/ 3# weight   x     Wrist flex and ext 3#     Pinch pin 8#      Sup/pron with 16oz hammer  x     Overheard pulley w/ emphasis on elbow ext       Scar massage  x     Bean bag toss w/ emphasis on elbow extension      Gentle Joint distraction   x   Kettle bell carry        Interventions/Modalities used:  [x] Therapeutic Exercise   [] Modalities:  [x] Therapeutic Activity    [] Ultrasound [] Elec Stimulation   [] Total Motion Release    [] Fluido [] Kinesiotaping  [] Neuromuscular Re-education   [] Ionto [x] Coldpack/hotpack   [] Instruction in HEP    Other: Manual     Objective Findings: Elbow ext 20(14), 26 L elbow edema, 80.2 L      Communication with other providers: POC sent to referring physician     Education provided to patient: HEP, edema management, scar massage, gentle AROM     Adverse Reactions to treatment: n/a     Time in:  1515  Time Out: 1600  Timed Code Treatment Minutes:  45  Total Treatment Minutes:  45    Treatment/Activity Tolerance:     [x]  Patient tolerated treatment well []  Patient limited by fatique    []  Patient limited by pain []  Patient limited by other medical complications   []  Other:     Long Term Goals:  Pt will decrease pain w/ activity to 0/10 to demonstrate increased functional activity tolerance Cont   Pt will increase L  to 95# to support use of a functional grasp for ADL and IADL performance Cont   Pt will decreased Quick Dash score to </= 10 to demonstrate a return to PLOF Cont   Pt will demonstrate competency in HEP to support maintenance of therapeutic progress and continued functional success Cont   Pt will improve AROM in the L elbow to WNL to support ability to perform daily/work tasks Cont      Short Term Goals:    Pt will participate in ther-ex and ther-act to progress functional activity tolerance/endurance Cont   Pt will decrease pain w/ activity to 2-3/10 to demonstrate increased functional activity tolerance Cont   Pt will demonstrate competency w/ scar management as part of HEP Met 7/12  Pt will decrease L elbow edema by 1.5cm to support ability to extend elbow and participate in daily tasks Met 8/26  Pt will improve AROM of the L elbow by 20d to support ability to perform duties necessary for return to work  Cont      Patient Requires Follow-up:  [x]  Yes  []  No    Plan: [x]  Continue per plan of care []  Alter current plan (see comments)   [x]  Plan of care initiated []  Hold pending MD visit []  Discharge    Plan for Next Session:  Cont per OT POC     Electronically signed by:  Zerha Vega OT,TONYA/L, 8/26/2024, 3:23 PM

## 2024-08-30 ENCOUNTER — HOSPITAL ENCOUNTER (OUTPATIENT)
Dept: OCCUPATIONAL THERAPY | Age: 29
Setting detail: THERAPIES SERIES
Discharge: HOME | End: 2024-08-30
Payer: MEDICAID

## 2024-08-30 PROCEDURE — 97110 THERAPEUTIC EXERCISES: CPT

## 2024-08-30 PROCEDURE — 97140 MANUAL THERAPY 1/> REGIONS: CPT

## 2024-08-30 PROCEDURE — 97530 THERAPEUTIC ACTIVITIES: CPT

## 2024-08-30 NOTE — PLAN OF CARE
[][facility]      [][address]     [place] [id number] [phone] [phone] Fax:[phone]  ________________________________________________________________________    Physician:     From: Kelsey Santoro OT  Patient: Julio C Subramanian      : 1995  Diagnosis:   ORIF L elbow/olecranon   Physician ICD 10 Code: S52.022D     Treatment Diagnosis:  Pain and stiffness of L elbow   ICD10 tx code:  M25.522   Date: 2024     MRN: [id number]     Occupational Therapy Certification/Re-Certification Form  Dear Dr. Dixon  The following patient has been evaluated for occupational therapy services and for therapy to continue, insurance requires physician review of the treatment plan initially and every 90 days. Please review the attached evaluation and/or summary of the patient's plan of care, and verify that you agree therapy should continue by signing the attached document and sending it back to our office.    Plan of Care/Treatment to date:  [x] Therapeutic Exercise   [] Modalities:  [x] Therapeutic Activity    [] Ultrasound [] Elec Stimulation   [] Total Motion Release    [] Fluido [] Kinesiotaping  [] Neuromuscular Re-education   [] Ionto [x] Coldpack/hotpack   [x] Instruction in HEP    Other:  [] Manual Therapy     [x] Scar management   [] Aquatic Therapy     [x] Edema management       Frequency/Duration:  # Days per week: [x] 1 day # Weeks: [] 1 week [] 5 weeks     [] 2 days   [] 2 weeks [] 6 weeks     [] 3 days   [] 3 weeks [] 7 weeks     [] 4 days   [x] 4 weeks [] 8 weeks         [] 9 weeks [] 10 weeks         [] 11 weeks [] 12 weeks    Rehab Potential/Progress: [] excellent [x] good [] fair  [] poor       Goals:  Long Term Goals:  Pt will decrease pain w/ activity to 0/10 to demonstrate increased functional activity tolerance  Cont  Pt will

## 2024-08-30 NOTE — FLOWSHEET NOTE
[][facility]      [][address]     [address] [address] [phone] [phone] [phone]  ________________________________________________________________________  Occupational Therapy Daily Recert    Date:  2024  Patient Name:  Julio C Subramanian    :  1995  Restrictions/Precautions:  General   Diagnosis:   ORIF L elbow/ olecranon   Treatment Diagnosis:  L elbow pain and stiffness   Insurance/Certification information:  Praveen  Referring Physician:   Dr. Dixon   Plan of care signed (Y/N):    Visit# / total visits:    Pain level:  7/10  at rest, having sharp pains at night that wake him up on the ulnar side of forearm near an old fracture site     Subjective: Pt wants to continue therapy because of pain and stiffness. Incresed ROM today. Flex is WNL. Stiff in extension but improved. Multiple complaints about feeling hardware thru incision and pain. No redness noted. Plan to continue  Prior Level of Function:  Independent, working full time  Patient Goals: Return to PLOF    Treatment Flowsheet   Right Left     Hot pack with distal exercises  x     Gentle AAROM to elbow  X      Elbow PROM   X      Blue Digi flex (7#)  x     Elbow flex/ext, shoulder press w/ 3# weight   x     Wrist flex and ext 3#  x   Pinch pin 8#   x   Sup/pron with 16oz hammer  x     Overheard pulley w/ emphasis on elbow ext       Scar massage  x     Bean bag toss w/ emphasis on elbow extension      Gentle Joint distraction   x   Kettle bell carry        Interventions/Modalities used:  [x] Therapeutic Exercise   [] Modalities:  [x] Therapeutic Activity    [] Ultrasound [] Elec Stimulation   [] Total Motion Release    [] Fluido [] Kinesiotaping  [] Neuromuscular Re-education   [] Ionto [x] Coldpack/hotpack   [] Instruction in HEP    Other: Manual

## 2024-09-03 ENCOUNTER — HOSPITAL ENCOUNTER (OUTPATIENT)
Dept: OCCUPATIONAL THERAPY | Age: 29
Setting detail: THERAPIES SERIES
Discharge: HOME | End: 2024-09-03
Payer: MEDICAID

## 2024-09-03 PROCEDURE — 97530 THERAPEUTIC ACTIVITIES: CPT

## 2024-09-03 PROCEDURE — 97140 MANUAL THERAPY 1/> REGIONS: CPT

## 2024-09-03 PROCEDURE — 97110 THERAPEUTIC EXERCISES: CPT

## 2024-09-03 NOTE — FLOWSHEET NOTE
[][facility]      [][address]     [address] [address] [phone] [phone] [phone]  ________________________________________________________________________  Occupational Therapy Daily Recert    Date:  9/3/2024  Patient Name:  Julio C Subramanian    :  1995  Restrictions/Precautions:  General   Diagnosis:   ORIF L elbow/ olecranon   Treatment Diagnosis:  L elbow pain and stiffness   Insurance/Certification information:  Praveen  Referring Physician:   Dr. Dixon   Plan of care signed (Y/N):    Visit# / total visits:    Pain level:  7/10  at rest, having sharp pains at night that wake him up on the ulnar side of forearm near an old fracture site     Subjective: Pt states still feels so stiff and like elbow needs to pop.  Prior Level of Function:  Independent, working full time  Patient Goals: Return to PLOF    Treatment Flowsheet   Right Left     Hot pack with distal exercises  x     Gentle AAROM to elbow  X      Elbow PROM   X      Blue Digi flex (7#)  x     Elbow flex/ext, shoulder press w/ 3# weight   x     Wrist flex and ext 3#  x   Pinch pin 8#   x   Sup/pron with 16oz hammer  x     Overheard pulley w/ emphasis on elbow ext       Scar massage  x     Bean bag toss w/ emphasis on elbow extension      Gentle Joint distraction   x   Kettle bell carry        Interventions/Modalities used:  [x] Therapeutic Exercise   [] Modalities:  [x] Therapeutic Activity    [] Ultrasound [] Elec Stimulation   [] Total Motion Release    [] Fluido [] Kinesiotaping  [] Neuromuscular Re-education   [] Ionto [x] Coldpack/hotpack   [] Instruction in HEP    Other: Manual     Objective Findings: Elbow ext 15(10)-same, 92.9# L -last session    Communication with other providers: POC sent to referring physician     Education provided to

## 2024-09-11 ENCOUNTER — HOSPITAL ENCOUNTER (OUTPATIENT)
Dept: OCCUPATIONAL THERAPY | Age: 29
Setting detail: THERAPIES SERIES
Discharge: HOME | End: 2024-09-11
Payer: MEDICAID

## 2024-09-11 PROCEDURE — 97110 THERAPEUTIC EXERCISES: CPT

## 2024-09-11 PROCEDURE — 97140 MANUAL THERAPY 1/> REGIONS: CPT

## 2024-09-11 PROCEDURE — 97530 THERAPEUTIC ACTIVITIES: CPT

## 2024-09-18 ENCOUNTER — HOSPITAL ENCOUNTER (OUTPATIENT)
Dept: OCCUPATIONAL THERAPY | Age: 29
Setting detail: THERAPIES SERIES
Discharge: HOME | End: 2024-09-18
Payer: MEDICAID

## 2024-09-18 PROCEDURE — 97140 MANUAL THERAPY 1/> REGIONS: CPT

## 2024-09-18 PROCEDURE — 97530 THERAPEUTIC ACTIVITIES: CPT

## 2024-09-18 PROCEDURE — 97110 THERAPEUTIC EXERCISES: CPT

## 2024-09-25 ENCOUNTER — HOSPITAL ENCOUNTER (OUTPATIENT)
Dept: OCCUPATIONAL THERAPY | Age: 29
Discharge: HOME | End: 2024-09-25

## 2024-10-02 ENCOUNTER — OFFICE VISIT (OUTPATIENT)
Dept: ORTHOPEDIC SURGERY | Age: 29
End: 2024-10-02
Payer: MEDICAID

## 2024-10-02 VITALS — HEART RATE: 77 BPM | TEMPERATURE: 97.3 F | RESPIRATION RATE: 16 BRPM | OXYGEN SATURATION: 96 %

## 2024-10-02 DIAGNOSIS — Z09 POSTOP CHECK: Primary | ICD-10-CM

## 2024-10-02 PROCEDURE — G8484 FLU IMMUNIZE NO ADMIN: HCPCS

## 2024-10-02 PROCEDURE — G8428 CUR MEDS NOT DOCUMENT: HCPCS

## 2024-10-02 PROCEDURE — 99213 OFFICE O/P EST LOW 20 MIN: CPT

## 2024-10-02 PROCEDURE — G8419 CALC BMI OUT NRM PARAM NOF/U: HCPCS

## 2024-10-02 PROCEDURE — 4004F PT TOBACCO SCREEN RCVD TLK: CPT

## 2024-10-02 ASSESSMENT — ENCOUNTER SYMPTOMS
COLOR CHANGE: 0
CHEST TIGHTNESS: 0
SHORTNESS OF BREATH: 0

## 2024-10-02 NOTE — PATIENT INSTRUCTIONS
Continue weight-bearing as tolerated.  Continue range of motion exercises as instructed.  Ice and elevate as needed.  Tylenol or Motrin for pain.  Follow up no sooner than 4 weeks with Dr Dixon if problem persist and a discussion should be had about removing plate and screws.       We are committed to providing you the best care possible.     If you receive a survey after visiting one of our offices, please take time to share your experience concerning your physician office visit.  These surveys are confidential and no health information about you is shared.     We are eager to improve for you and we are counting on your feedback to help make that happen. If you felt my care was outstanding, please mention me by name: Yenni PEDERSON

## 2024-10-02 NOTE — PROGRESS NOTES
stress examination.  Patient only able to reach 10 degrees short of full extension but can reach full extension during active and passive maneuvers.  Mild tenderness to palpation at the distal aspect of the plate as it transitions to his olecranon shaft.  Sensation motor functions intact throughout.  Skin is intact.  No erythema, drainage, instability.    Diagnostic testing:  X-rays reviewed in office, I independently reviewed the films in the office today:     FINDINGS:     Fixation hardware in the proximal ulna which appears intact. No acute fracture  is seen. 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     IMPRESSION:     Hardware is intact. No acute findings.    Office Procedures:  No orders of the defined types were placed in this encounter.      Assessment and Plan  1.  Status post left olecranon open reduction and internal fixation    Continue weight-bearing as tolerated.  Continue range of motion exercises as instructed.  Ice and elevate as needed.  Tylenol or Motrin for pain.  Follow up no sooner than 4 weeks with Dr Dixon if problem persist and a possible discussion could happen in regards to removing the plate and screws if the bone is fully healed.  I explained to the patient that there may be a strong argument to keep the plate and screws for a longer amount of time and continue home exercises.    Electronically signed by Rober Hopkins PA-C on 10/2/2024 at 2:00 PM

## 2024-10-09 ENCOUNTER — HOSPITAL ENCOUNTER (OUTPATIENT)
Dept: OCCUPATIONAL THERAPY | Age: 29
Setting detail: THERAPIES SERIES
Discharge: HOME | End: 2024-10-09
Payer: MEDICAID

## 2024-10-09 PROCEDURE — 97140 MANUAL THERAPY 1/> REGIONS: CPT

## 2024-10-09 PROCEDURE — 97110 THERAPEUTIC EXERCISES: CPT

## 2024-10-09 PROCEDURE — 97530 THERAPEUTIC ACTIVITIES: CPT

## 2024-10-09 NOTE — FLOWSHEET NOTE
[][facility]      [][address], [address]     [address] [address] [phone] [phone] [phone]  ________________________________________________________________________  Occupational Therapy Daily Recert    Date:  10/9/2024  Patient Name:  Julio C Subramanian    :  1995  Restrictions/Precautions:  General   Diagnosis:   ORIF L elbow/ olecranon   Treatment Diagnosis:  L elbow pain and stiffness   Insurance/Certification information:  [employer]  Referring Physician:   Dr. Dixon   Plan of care signed (Y/N):    Visit# / total visits:   Pain level:  3/10    Subjective: Reports [][pworked one day and it hurt his arm so he needs to find another job. Still has multiple complaints of pain  Prior Level of Function:  Independent, working full time  Patient Goals: Return to PLOF    Treatment Flowsheet   Right Left     Hot pack with distal exercises  x     Gentle AAROM to elbow  X      Elbow PROM   X      Blue Digi flex (7#)  x     Elbow flex/ext, shoulder press w/ 3# weight   x     Wrist flex and ext 3#  x   Pinch pin 8#   x   Sup/pron with 16oz hammer  x     Overheard pulley w/ emphasis on elbow ext       Scar massage  x     Bean bag toss w/ emphasis on elbow extension      Gentle Joint distraction   x   Massage to tight areas in the biceps   x     Interventions/Modalities used:  [x] Therapeutic Exercise   [] Modalities:  [x] Therapeutic Activity    [] Ultrasound [] Elec Stimulation   [] Total Motion Release    [] Fluido [] Kinesiotaping  [] Neuromuscular Re-education   [] Ionto [x] Coldpack/hotpack   [] Instruction in HEP    Other: Manual     Objective Findings: Elbox ext.: 10, flexion 148 L  99.8-last session  Pron/sup WNL.    Communication with other providers: POC sent to referring physician     Education provided to patient: HEP,

## 2025-04-17 ENCOUNTER — TELEPHONE (OUTPATIENT)
Dept: PULMONOLOGY | Age: 30
End: 2025-04-17

## 2025-05-07 ENCOUNTER — TELEPHONE (OUTPATIENT)
Dept: PULMONOLOGY | Age: 30
End: 2025-05-07

## 2025-05-07 NOTE — TELEPHONE ENCOUNTER
Patient was in our office today for his appointment. While waiting for the provider to come in, patient opened the door to the room. This MA went over and apologized for the wait and let the patient know that due to privacy, we would need to close the door at which time patient said he was claustrophobic. I apologized to the patient letting him know that while I can leave the door cracked, we cannot leave it open due to privacy concerns. At that point the patient announced that he was leaving because he was tired of waiting and was claustrophobic and he proceeded to get up and walk out. Patient asked for the Financial Responsibility form that he filled out, I gave him the form and he stormed out. Fax sent to [facility] to let them know about his behavior today and that he will need to be referred to another provider.

## 2025-05-08 ENCOUNTER — OFFICE VISIT (OUTPATIENT)
Dept: ORTHOPEDIC SURGERY | Age: 30
End: 2025-05-08
Payer: MEDICAID

## 2025-05-08 ENCOUNTER — TELEPHONE (OUTPATIENT)
Dept: ORTHOPEDIC SURGERY | Age: 30
End: 2025-05-08

## 2025-05-08 VITALS — BODY MASS INDEX: 26.86 KG/M2 | OXYGEN SATURATION: 97 % | HEART RATE: 64 BPM | HEIGHT: 66 IN

## 2025-05-08 DIAGNOSIS — T84.84XA PAINFUL ORTHOPAEDIC HARDWARE: ICD-10-CM

## 2025-05-08 DIAGNOSIS — S52.022D CLOSED FRACTURE OF OLECRANON PROCESS OF LEFT ULNA WITH ROUTINE HEALING, SUBSEQUENT ENCOUNTER: Primary | ICD-10-CM

## 2025-05-08 DIAGNOSIS — Z01.818 PREOPERATIVE TESTING: Primary | ICD-10-CM

## 2025-05-08 PROCEDURE — 99214 OFFICE O/P EST MOD 30 MIN: CPT | Performed by: ORTHOPAEDIC SURGERY

## 2025-05-08 PROCEDURE — 4004F PT TOBACCO SCREEN RCVD TLK: CPT | Performed by: ORTHOPAEDIC SURGERY

## 2025-05-08 PROCEDURE — G8427 DOCREV CUR MEDS BY ELIG CLIN: HCPCS | Performed by: ORTHOPAEDIC SURGERY

## 2025-05-08 PROCEDURE — G8419 CALC BMI OUT NRM PARAM NOF/U: HCPCS | Performed by: ORTHOPAEDIC SURGERY

## 2025-05-08 NOTE — PATIENT INSTRUCTIONS
If you have any questions regarding your surgery scheduling, please call our office and ask to speak with Betzaida [phone].

## 2025-05-08 NOTE — PROGRESS NOTES
Pt returns to the office with left elbow pain. Pt states over the last 3 months his ORIF lt elbow has been bothering him. Pt states he will get sharp pains globally in his elbow. Pt states fluid has built up around his elbow causing a lot of discomfort. Pt denies any injuries or falls to his elbow recently. Pt rates his pain 10/10 and would like to discuss getting his HWR.

## 2025-05-08 NOTE — PROGRESS NOTES
5/8/2025   Chief Complaint   Patient presents with    Pain     ORIF LT elbow        History of Present Illness:                             Julio C Subramanian is a 29 y.o. male who presents today for evaluation of his left elbow pain.  He has healed well following open duction internal fixation of his olecranon a year ago.  He did physical therapy was able to increase his strength and range of motion but does still have some limitations with endrange of extension.    Pain is getting worse now with direct pressure overlying the posterior aspect of his elbow especially when resting on a hard surface.  He denies any redness or drainage or infectious symptoms but does have sensitivity and pressure overlying the prominent hardware at the posterior aspect of his elbowz    Pt returns to the office with left elbow pain. Pt states over the last 3 months his ORIF lt elbow has been bothering him. Pt states he will get sharp pains globally in his elbow. Pt states fluid has built up around his elbow causing a lot of discomfort. Pt denies any injuries or falls to his elbow recently. Pt rates his pain 10/10 and would like to discuss getting his HWR.           Medical History  Patient's medications, allergies, past medical, surgical, social and family histories were reviewed and updated as appropriate.    Past Medical History:   Diagnosis Date    Cystic fibrosis (HCC)     No issues for 20 years (updated 5/1/24)    GERD (gastroesophageal reflux disease)     Migraine      Past Surgical History:   Procedure Laterality Date    ELBOW SURGERY Left 5/6/2024    OLECRANON OPEN REDUCTION INTERNAL FIXATION performed by Fabio Dixon MD at [facility] OR    TYMPANOSTOMY TUBE PLACEMENT       No family history on file.  Social History     Socioeconomic History    Marital status:    Tobacco Use    Smoking status: Every Day     Current packs/day: 1.00     Types: Cigarettes    Smokeless tobacco: Never   Vaping Use    Vaping status: Never Used

## 2025-05-08 NOTE — TELEPHONE ENCOUNTER
Scheduled patient for:    Left Elbow Hardware Removal  CPT: 78099  ICD 10: T84.84XA  Surgery Date: 6/18/2025  Surgeon: Destiny  Facility: [facility]  Anesthesia: General  Product: Synthes and C/arm    Clearances sent to:  PCP: [facility] Provider    Insurance: [employer]  Prior auth started on 5/8/25-per provider lookup tool-  No Prior Auth is Required.

## 2025-05-12 ASSESSMENT — ENCOUNTER SYMPTOMS
CHEST TIGHTNESS: 0
COLOR CHANGE: 0
SHORTNESS OF BREATH: 0

## 2025-06-11 ENCOUNTER — HOSPITAL ENCOUNTER (OUTPATIENT)
Dept: GENERAL RADIOLOGY | Age: 30
Discharge: HOME | End: 2025-06-11
Payer: MEDICAID

## 2025-06-11 ENCOUNTER — HOSPITAL ENCOUNTER (OUTPATIENT)
Age: 30
Discharge: HOME | End: 2025-06-11
Payer: MEDICAID

## 2025-06-11 DIAGNOSIS — Z01.818 PREOPERATIVE TESTING: ICD-10-CM

## 2025-06-11 PROCEDURE — 36415 COLL VENOUS BLD VENIPUNCTURE: CPT

## 2025-06-11 PROCEDURE — 71046 X-RAY EXAM CHEST 2 VIEWS: CPT

## 2025-06-11 PROCEDURE — 83036 HEMOGLOBIN GLYCOSYLATED A1C: CPT

## 2025-06-11 PROCEDURE — 85025 COMPLETE CBC W/AUTO DIFF WBC: CPT

## 2025-06-12 LAB
BASOPHILS # BLD: 0.07 K/UL
BASOPHILS NFR BLD: 1 % (ref 0–1)
EOSINOPHIL # BLD: 0.21 K/UL
EOSINOPHILS RELATIVE PERCENT: 2 % (ref 0–3)
ERYTHROCYTE [DISTWIDTH] IN BLOOD BY AUTOMATED COUNT: 13.3 % (ref 11.7–14.9)
EST. AVERAGE GLUCOSE BLD GHB EST-MCNC: 142 MG/DL
HBA1C MFR BLD: 6.6 % (ref 4.2–6.3)
HCT VFR BLD AUTO: 51 % (ref 42–52)
HGB BLD-MCNC: 16.6 G/DL (ref 13.5–18)
IMM GRANULOCYTES # BLD AUTO: 0.02 K/UL
IMM GRANULOCYTES NFR BLD: 0 %
LYMPHOCYTES NFR BLD: 3.48 K/UL
LYMPHOCYTES RELATIVE PERCENT: 38 % (ref 24–44)
MCH RBC QN AUTO: 29.4 PG (ref 27–31)
MCHC RBC AUTO-ENTMCNC: 32.5 G/DL (ref 32–36)
MCV RBC AUTO: 90.3 FL (ref 78–100)
MONOCYTES NFR BLD: 0.79 K/UL
MONOCYTES NFR BLD: 9 % (ref 0–5)
NEUTROPHILS NFR BLD: 50 % (ref 36–66)
NEUTS SEG NFR BLD: 4.5 K/UL
PLATELET # BLD AUTO: 274 K/UL (ref 140–440)
PMV BLD AUTO: 10.6 FL (ref 7.5–11.1)
RBC # BLD AUTO: 5.65 M/UL (ref 4.6–6.2)
WBC OTHER # BLD: 9.1 K/UL (ref 4–10.5)

## 2025-06-13 ENCOUNTER — HOSPITAL ENCOUNTER (OUTPATIENT)
Age: 30
Setting detail: SPECIMEN
Discharge: HOME | End: 2025-06-13
Payer: MEDICAID

## 2025-06-13 LAB
ANION GAP SERPL CALCULATED.3IONS-SCNC: 11 MMOL/L (ref 9–17)
BUN SERPL-MCNC: 16 MG/DL (ref 7–20)
CALCIUM SERPL-MCNC: 9.9 MG/DL (ref 8.3–10.6)
CHLORIDE SERPL-SCNC: 105 MMOL/L (ref 99–110)
CO2 SERPL-SCNC: 22 MMOL/L (ref 21–32)
CREAT SERPL-MCNC: 1 MG/DL (ref 0.9–1.3)
GFR, ESTIMATED: 89 ML/MIN/1.73M2
GLUCOSE SERPL-MCNC: 130 MG/DL (ref 74–99)
POTASSIUM SERPL-SCNC: 4.4 MMOL/L (ref 3.5–5.1)
SODIUM SERPL-SCNC: 138 MMOL/L (ref 136–145)

## 2025-06-13 PROCEDURE — 80048 BASIC METABOLIC PNL TOTAL CA: CPT

## 2025-06-18 DIAGNOSIS — S52.022D CLOSED FRACTURE OF OLECRANON PROCESS OF LEFT ULNA WITH ROUTINE HEALING, SUBSEQUENT ENCOUNTER: Primary | ICD-10-CM

## 2025-06-18 DIAGNOSIS — T84.84XA PAINFUL ORTHOPAEDIC HARDWARE: ICD-10-CM

## 2025-06-18 RX ORDER — OXYCODONE AND ACETAMINOPHEN 5; 325 MG/1; MG/1
1 TABLET ORAL EVERY 6 HOURS PRN
Qty: 15 TABLET | Refills: 0 | Status: SHIPPED | OUTPATIENT
Start: 2025-06-18 | End: 2025-06-23

## 2025-07-03 ENCOUNTER — OFFICE VISIT (OUTPATIENT)
Dept: ORTHOPEDIC SURGERY | Age: 30
End: 2025-07-03

## 2025-07-03 VITALS
BODY MASS INDEX: 27.32 KG/M2 | WEIGHT: 170 LBS | HEIGHT: 66 IN | RESPIRATION RATE: 18 BRPM | HEART RATE: 71 BPM | OXYGEN SATURATION: 97 %

## 2025-07-03 DIAGNOSIS — S52.022D CLOSED FRACTURE OF OLECRANON PROCESS OF LEFT ULNA WITH ROUTINE HEALING, SUBSEQUENT ENCOUNTER: Primary | ICD-10-CM

## 2025-07-03 DIAGNOSIS — Z09 POSTOP CHECK: ICD-10-CM

## 2025-07-03 PROCEDURE — 99024 POSTOP FOLLOW-UP VISIT: CPT | Performed by: ORTHOPAEDIC SURGERY

## 2025-07-03 RX ORDER — DOXYCYCLINE HYCLATE 100 MG
TABLET ORAL
COMMUNITY
Start: 2025-04-14

## 2025-07-03 RX ORDER — LISDEXAMFETAMINE DIMESYLATE 50 MG/1
50 CAPSULE ORAL DAILY
COMMUNITY

## 2025-07-03 RX ORDER — RISPERIDONE 2 MG/1
2 TABLET ORAL NIGHTLY
COMMUNITY

## 2025-07-03 ASSESSMENT — PROMIS GLOBAL HEALTH SCALE
SUM OF RESPONSES TO QUESTIONS 3, 6, 7, & 8: 17
IN GENERAL, WOULD YOU SAY YOUR QUALITY OF LIFE IS...[ON A SCALE OF 1 (POOR) TO 5 (EXCELLENT)]: GOOD
HOW IS THE PROMIS V1.1 BEING ADMINISTERED?: ELECTRONIC
IN THE PAST 7 DAYS, HOW OFTEN HAVE YOU BEEN BOTHERED BY EMOTIONAL PROBLEMS, SUCH AS FEELING ANXIOUS, DEPRESSED, OR IRRITABLE [ON A SCALE FROM 1 (NEVER) TO 5 (ALWAYS)]?: SOMETIMES
HOW IS THE PROMIS V1.1 BEING ADMINISTERED?: ELECTRONIC
IN GENERAL, HOW WOULD YOU RATE YOUR PHYSICAL HEALTH [ON A SCALE OF 1 (POOR) TO 5 (EXCELLENT)]?: GOOD
IN GENERAL, PLEASE RATE HOW WELL YOU CARRY OUT YOUR USUAL SOCIAL ACTIVITIES (INCLUDES ACTIVITIES AT HOME, AT WORK, AND IN YOUR COMMUNITY, AND RESPONSIBILITIES AS A PARENT, CHILD, SPOUSE, EMPLOYEE, FRIEND, ETC) [ON A SCALE OF 1 (POOR) TO 5 (EXCELLENT)]?: FAIR
TO WHAT EXTENT ARE YOU ABLE TO CARRY OUT YOUR EVERYDAY PHYSICAL ACTIVITIES SUCH AS WALKING, CLIMBING STAIRS, CARRYING GROCERIES, OR MOVING A CHAIR [ON A SCALE OF 1 (NOT AT ALL) TO 5 (COMPLETELY)]?: MOSTLY
IN GENERAL, HOW WOULD YOU RATE YOUR SATISFACTION WITH YOUR SOCIAL ACTIVITIES AND RELATIONSHIPS [ON A SCALE OF 1 (POOR) TO 5 (EXCELLENT)]?: GOOD
TO WHAT EXTENT ARE YOU ABLE TO CARRY OUT YOUR EVERYDAY PHYSICAL ACTIVITIES SUCH AS WALKING, CLIMBING STAIRS, CARRYING GROCERIES, OR MOVING A CHAIR [ON A SCALE OF 1 (NOT AT ALL) TO 5 (COMPLETELY)]?: MOSTLY
SUM OF RESPONSES TO QUESTIONS 2, 4, 5, & 10: 12
WHO IS THE PERSON COMPLETING THE PROMIS V1.1 SURVEY?: SELF
IN THE PAST 7 DAYS, HOW WOULD YOU RATE YOUR PAIN ON AVERAGE [ON A SCALE FROM 0 (NO PAIN) TO 10 (WORST IMAGINABLE PAIN)]?: 5
IN GENERAL, HOW WOULD YOU RATE YOUR SATISFACTION WITH YOUR SOCIAL ACTIVITIES AND RELATIONSHIPS [ON A SCALE OF 1 (POOR) TO 5 (EXCELLENT)]?: GOOD
IN GENERAL, PLEASE RATE HOW WELL YOU CARRY OUT YOUR USUAL SOCIAL ACTIVITIES (INCLUDES ACTIVITIES AT HOME, AT WORK, AND IN YOUR COMMUNITY, AND RESPONSIBILITIES AS A PARENT, CHILD, SPOUSE, EMPLOYEE, FRIEND, ETC) [ON A SCALE OF 1 (POOR) TO 5 (EXCELLENT)]?: FAIR
WHO IS THE PERSON COMPLETING THE PROMIS V1.1 SURVEY?: SELF
IN GENERAL, HOW WOULD YOU RATE YOUR MENTAL HEALTH, INCLUDING YOUR MOOD AND YOUR ABILITY TO THINK [ON A SCALE OF 1 (POOR) TO 5 (EXCELLENT)]?: GOOD
IN THE PAST 7 DAYS, HOW WOULD YOU RATE YOUR PAIN ON AVERAGE [ON A SCALE FROM 0 (NO PAIN) TO 10 (WORST IMAGINABLE PAIN)]?: 5
IN GENERAL, WOULD YOU SAY YOUR HEALTH IS...[ON A SCALE OF 1 (POOR) TO 5 (EXCELLENT)]: GOOD
IN THE PAST 7 DAYS, HOW OFTEN HAVE YOU BEEN BOTHERED BY EMOTIONAL PROBLEMS, SUCH AS FEELING ANXIOUS, DEPRESSED, OR IRRITABLE [ON A SCALE FROM 1 (NEVER) TO 5 (ALWAYS)]?: SOMETIMES

## 2025-07-03 NOTE — PROGRESS NOTES
7/3/2025   Chief Complaint   Patient presents with    Elbow Pain     Left elbow HWR         History of Present Illness:                             Julio C Subramanian is a 29 y.o. male who returns today for follow-up of his left elbow.  He has done well through the healing process and feels that his elbow is coming along well.  He still has some stiffness and mild soreness at the healing surgical site but overall is pleased with his healing process following surgery.    Patient returns to the office with a 2 week post op of his left elbow HWR. Pt stated that the elbow is doing well and has seen his pain reduced as time has gone on. Pt stated that he has discontinued his pain medication and has working on mobility.        Medical History  Patient's medications, allergies, past medical, surgical, social and family histories were reviewed and updated as appropriate.      Review of Systems                                            Examination:  General Exam:  Vitals: Pulse 71   Resp 18   Ht 1.676 m (5' 6\")   Wt 77.1 kg (170 lb)   SpO2 97%   BMI 27.44 kg/m²    Physical Exam     Left upper extremity:  Well-healed surgical scar over the posterior aspect of the elbow and forearm.  Sutures were removed.  No erythema, drainage, or induration.  Normal alignment of the elbow.  Painless active and passive range of motion of the elbow with mild persistent limitations in his range of motion with approximately 15 degrees short of full extension present.  Strength is intact with 5 out of 5 strength and elbow flexion extension and sensation motor functions intact throughout the upper extremity    Diagnostic testing:  X-rays reviewed in office, I independently reviewed the films in the office today:     XR ELBOW LEFT STANDARD  Result Date: 7/3/2025  X-ray images of the left elbow show well aligned elbow joint with healed olecranon.  Evidence of prior hardware removal at the olecranon.  No evidence of fracture or acute

## 2025-07-03 NOTE — PROGRESS NOTES
Patient returns to the office with a 2 week post op of his left elbow HWR. Pt stated that the elbow is doing well and has seen his pain reduced as time has gone on. Pt stated that he has discontinued his pain medication and has working on mobility.

## 2025-07-03 NOTE — PATIENT INSTRUCTIONS
Continue weight-bearing as tolerated.  Continue range of motion exercises as instructed.  Ice and elevate as needed.  Tylenol or Motrin for pain.  Out patient Physical Therapy has been ordered by your provider. [facility] will call you to set up therapy. If you have not heard from them within 24-48 hours of today's appointment, please reach out to them at [phone].  Follow up as needed    We are committed to providing you the best care possible.     If you receive a survey after visiting one of our offices, please take time to share your experience concerning your physician office visit.  These surveys are confidential and no health information about you is shared.     We are eager to improve for you and we are counting on your feedback to help make that happen. If you felt my care was outstanding, please mention me by name: Yenni PEDERSNO

## 2025-07-25 ENCOUNTER — HOSPITAL ENCOUNTER (OUTPATIENT)
Dept: PHYSICAL THERAPY | Age: 30
Setting detail: THERAPIES SERIES
Discharge: HOME | End: 2025-07-25

## 2025-07-25 DIAGNOSIS — S52.032A CLOSED DISPLACED INTRA-ARTICULAR FRACTURE OF OLECRANON PROCESS OF LEFT ULNA, INITIAL ENCOUNTER: Primary | ICD-10-CM

## 2025-07-25 NOTE — FLOWSHEET NOTE
Appointment was cancelled, PT stated that patient needed OT.  Requesting new referral from [facility].

## (undated) DEVICE — DRAPE,EXTREMITY,89X128,STERILE: Brand: MEDLINE

## (undated) DEVICE — SYRINGE IRRIG 60ML SFT PLIABLE BLB EZ TO GRP 1 HND USE W/

## (undated) DEVICE — BANDAGE COMPR W4INXL5YD WHT BGE POLY COT M E WRP WV HK AND

## (undated) DEVICE — APPLICATOR MEDICATED L4IN PVP IOD SAT PREP SOL PD SWABSTK

## (undated) DEVICE — DRESSING,GAUZE,XEROFORM,CURAD,1"X8",ST: Brand: CURAD

## (undated) DEVICE — DRAPE,U/ SHT,SPLIT,PLAS,STERIL: Brand: MEDLINE

## (undated) DEVICE — PAD,ABDOMINAL,5"X9",ST,LF,25/BX: Brand: MEDLINE INDUSTRIES, INC.

## (undated) DEVICE — LAPAROSCOPIC TROCAR SLEEVE/SINGLE USE: Brand: KII® OPTICAL ACCESS SYSTEM

## (undated) DEVICE — BIT DRL L110MM DIA2.5MM G QUIK CPL W/O STP REUSE

## (undated) DEVICE — LINER,SEMI-RIGID,3000CC,50EA/CS: Brand: MEDLINE

## (undated) DEVICE — SOLUTION PREP PAINT POV IOD FOR SKIN MUCOUS MEM

## (undated) DEVICE — ZIMMER® STERILE DISPOSABLE TOURNIQUET CUFF WITH PLC, DUAL PORT, SINGLE BLADDER, 18 IN. (46 CM)

## (undated) DEVICE — SOLUTION IV IRRIG WATER 1000ML POUR BRL 2F7114

## (undated) DEVICE — DRESSING TRNSPAR W2XL2.75IN FLM SHT SEMIPERMEABLE WIND

## (undated) DEVICE — Device

## (undated) DEVICE — SPONGE LAP W18XL18IN WHT COT 4 PLY FLD STRUNG RADPQ DISP ST 2 PER PACK

## (undated) DEVICE — COTTON UNDERCAST PADDING,CRIMPED FINISH: Brand: WEBRIL

## (undated) DEVICE — TOWEL,OR,DSP,ST,BLUE,STD,6/PK,12PK/CS: Brand: MEDLINE

## (undated) DEVICE — COUNTER NDL 30 COUNT FOAM STRP SGL MAG

## (undated) DEVICE — SPONGE GZ W4XL8IN COT WVN 12 PLY

## (undated) DEVICE — PENCIL ES CRD L10FT HND SWCHING ROCK SWCH W/ EDGE COAT BLDE

## (undated) DEVICE — TUBING, SUCTION, 9/32" X 10', STRAIGHT: Brand: MEDLINE

## (undated) DEVICE — SUTURE MCRYL SZ 4-0 L18IN ABSRB UD L19MM PS-2 3/8 CIR PRIM Y496G

## (undated) DEVICE — Z INACTIVE NO ACTIVE SUPPLIER APPLICATOR MEDICATED 26 CC TINT HI-LITE ORNG STRL CHLORAPREP

## (undated) DEVICE — COVER,C-ARM,41X74: Brand: MEDLINE

## (undated) DEVICE — TROCAR: Brand: KII FIOS FIRST ENTRY

## (undated) DEVICE — MARKER SURG SKIN UTIL REGULAR/FINE 2 TIP W/ RUL AND 9 LBL

## (undated) DEVICE — SUTURE ETHILON SZ 3-0 L30IN NONABSORBABLE BLK FS-1 L24MM 3/8 669H

## (undated) DEVICE — Z DISCONTINUED USE 2220295 SUTURE VICRYL SZ 0 L18IN ABSRB UD L36MM CT-1 1/2 CIR J840D

## (undated) DEVICE — ADHESIVE SKIN CLSR 0.7ML TOP DERMBND ADV

## (undated) DEVICE — YANKAUER,FLEXIBLE HANDLE,REGLR CAPACITY: Brand: MEDLINE INDUSTRIES, INC.

## (undated) DEVICE — BAG SPEC REM 224ML W4XL6IN DIA10MM 1 HND GYN DISP ENDOPCH

## (undated) DEVICE — TROCAR: Brand: KII® SLEEVE

## (undated) DEVICE — BANDAGE,SELF ADHRNT,COFLEX,4"X5YD,STRL: Brand: COLABEL

## (undated) DEVICE — TOTAL TRAY, DB, 100% SILI FOLEY, 16FR 10: Brand: MEDLINE

## (undated) DEVICE — SOLUTION IV 1000ML 0.9% SOD CHL FOR IRRIG PLAS CONT

## (undated) DEVICE — BIT DRL L140MM DIA2MM QUIK CPL 3 FLUT CALIB DEPTH MRK W/O

## (undated) DEVICE — ELECTRODE ES AD CRDLSS PT RET REM POLYHESIVE

## (undated) DEVICE — GLOVE SURG SZ 75 CRM LTX FREE POLYISOPRENE POLYMER BEAD ANTI

## (undated) DEVICE — INTENDED FOR TISSUE SEPARATION, AND OTHER PROCEDURES THAT REQUIRE A SHARP SURGICAL BLADE TO PUNCTURE OR CUT.: Brand: BARD-PARKER ® STAINLESS STEEL BLADES

## (undated) DEVICE — CONVERTORS STOCKINETTE: Brand: CONVERTORS

## (undated) DEVICE — PACK,BASIC,IX: Brand: MEDLINE

## (undated) DEVICE — SEALER ENDOSCP L37CM NANO COAT BLNT TIP LAP DIV

## (undated) DEVICE — PADDING CAST W6INXL4YD COT LO LINTING WYTEX

## (undated) DEVICE — SPLINT ORTH SINGLE SIDE 12X3 IN PRECUT PADDED FIBERGLASS LF

## (undated) DEVICE — DRAPE SHEET ULTRAGARD: Brand: MEDLINE

## (undated) DEVICE — GLOVE SURG SZ 8 L12IN THK75MIL DK GRN LTX FREE

## (undated) DEVICE — GOWN,ECLIPSE,POLYRNF,BRTHSLV,XL,30/CS: Brand: MEDLINE

## (undated) DEVICE — BANDAGE,ELASTIC,ESMARK,STERILE,4"X9',LF: Brand: MEDLINE